# Patient Record
Sex: MALE | URBAN - METROPOLITAN AREA
[De-identification: names, ages, dates, MRNs, and addresses within clinical notes are randomized per-mention and may not be internally consistent; named-entity substitution may affect disease eponyms.]

---

## 2021-03-08 ENCOUNTER — NURSE ONLY (OUTPATIENT)
Dept: PRIMARY CARE CLINIC | Age: 71
End: 2021-03-08
Payer: MEDICARE

## 2021-03-08 DIAGNOSIS — Z23 HIGH PRIORITY FOR COVID-19 VIRUS VACCINATION: Primary | ICD-10-CM

## 2021-03-11 ENCOUNTER — NURSE ONLY (OUTPATIENT)
Dept: PRIMARY CARE CLINIC | Age: 71
End: 2021-03-11

## 2021-03-11 PROCEDURE — 91301 COVID-19, MODERNA VACCINE 100MCG/0.5ML DOSE: CPT | Performed by: FAMILY MEDICINE

## 2021-03-11 PROCEDURE — 0011A COVID-19, MODERNA VACCINE 100MCG/0.5ML DOSE: CPT | Performed by: FAMILY MEDICINE

## 2021-04-09 ENCOUNTER — NURSE ONLY (OUTPATIENT)
Dept: PRIMARY CARE CLINIC | Age: 71
End: 2021-04-09

## 2021-04-09 DIAGNOSIS — Z23 HIGH PRIORITY FOR COVID-19 VIRUS VACCINATION: Primary | ICD-10-CM

## 2022-12-08 ENCOUNTER — OFFICE VISIT (OUTPATIENT)
Dept: ORTHOPEDIC SURGERY | Age: 72
End: 2022-12-08
Payer: MEDICARE

## 2022-12-08 VITALS — WEIGHT: 223 LBS | HEIGHT: 68 IN | BODY MASS INDEX: 33.8 KG/M2

## 2022-12-08 DIAGNOSIS — M25.819 CYST OF JOINT OF SHOULDER: ICD-10-CM

## 2022-12-08 DIAGNOSIS — M25.511 RIGHT SHOULDER PAIN, UNSPECIFIED CHRONICITY: Primary | ICD-10-CM

## 2022-12-08 PROCEDURE — 3017F COLORECTAL CA SCREEN DOC REV: CPT | Performed by: ORTHOPAEDIC SURGERY

## 2022-12-08 PROCEDURE — 99203 OFFICE O/P NEW LOW 30 MIN: CPT | Performed by: ORTHOPAEDIC SURGERY

## 2022-12-08 PROCEDURE — G8484 FLU IMMUNIZE NO ADMIN: HCPCS | Performed by: ORTHOPAEDIC SURGERY

## 2022-12-08 PROCEDURE — 1123F ACP DISCUSS/DSCN MKR DOCD: CPT | Performed by: ORTHOPAEDIC SURGERY

## 2022-12-08 PROCEDURE — G8427 DOCREV CUR MEDS BY ELIG CLIN: HCPCS | Performed by: ORTHOPAEDIC SURGERY

## 2022-12-08 PROCEDURE — 4004F PT TOBACCO SCREEN RCVD TLK: CPT | Performed by: ORTHOPAEDIC SURGERY

## 2022-12-08 PROCEDURE — G8417 CALC BMI ABV UP PARAM F/U: HCPCS | Performed by: ORTHOPAEDIC SURGERY

## 2022-12-08 RX ORDER — EZETIMIBE 10 MG/1
TABLET ORAL NIGHTLY
COMMUNITY
Start: 2022-05-31

## 2022-12-08 RX ORDER — BUSPIRONE HYDROCHLORIDE 10 MG/1
TABLET ORAL 2 TIMES DAILY
COMMUNITY
Start: 2022-05-31

## 2022-12-08 RX ORDER — CELECOXIB 200 MG/1
CAPSULE ORAL DAILY
COMMUNITY
Start: 2022-05-31

## 2022-12-08 RX ORDER — TAMSULOSIN HYDROCHLORIDE 0.4 MG/1
CAPSULE ORAL NIGHTLY
COMMUNITY
Start: 2022-05-31

## 2022-12-08 RX ORDER — OMEPRAZOLE 20 MG/1
CAPSULE, DELAYED RELEASE ORAL
COMMUNITY
Start: 2022-05-31

## 2022-12-08 RX ORDER — CITALOPRAM 20 MG/1
TABLET ORAL DAILY
COMMUNITY
Start: 2022-05-31

## 2022-12-08 NOTE — PROGRESS NOTES
[facility]  History and Physical  Shoulder Pain    Date:  2022    Name:  Francie Canales  Address:  [address] [place] [address]  Annabel Castillo [id number]    :  1950      Age:   81 y.o.    SSN:  (Not on file)      Medical Record Number:  [id number]    Reason for Visit:    New Patient (OP/NP Right Shoulder)      HPI:   Francie Canales is a 81 y.o. male who presents to our office today complaining of  right shoulder pain. Patient reports he started noticing a swelling over the right shoulder that appeared about 6 months ago and has only grown in size since then. He reports its not very achy and does not have a sharp pain. He reports on occasion he has some mild soreness. It does not restrict his movement or strength in any way. Of note the patient does have a history of undergoing a right rotator cuff repair nearly 15 years ago. He continues to do well from that surgery and has not had any questions or concerns since then. 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 Pain Assessment  Location of Pain: Shoulder  Location Modifiers: Right  Severity of Pain: 3  Quality of Pain: Sharp  Duration of Pain: Persistent  Frequency of Pain: Intermittent  Aggravating Factors:  (positional)  Limiting Behavior: No  Relieving Factors: Rest  Work-Related Injury: No  Are there other pain locations you wish to document?: No    Review of Systems:  A 14 point review of systems available in the scanned medical record as documented by the patient. The review is negative with the exception of those things mentioned in the History of Present Illness and Past Medical History. Past History:  Past Medical History:   Diagnosis Date    Arthritis     Cancer ([facility] [address]     H/O ulcer disease     Kidney problem      No past surgical history on file.   Current Outpatient Medications on File Prior to Visit   Medication Sig Dispense Refill    celecoxib (CELEBREX) 200 MG capsule Take by mouth daily      busPIRone (BUSPAR) 10 MG tablet Take by mouth 2 times daily      citalopram (CELEXA) 20 MG tablet Take by mouth daily      tamsulosin (FLOMAX) 0.4 MG capsule Take by mouth nightly      omeprazole (PRILOSEC) 20 MG delayed release capsule Take by mouth 2 times daily (before meals)      ezetimibe (ZETIA) 10 MG tablet Take by mouth nightly       No current facility-administered medications on file prior to visit.      Social History     Socioeconomic History    Marital status: Unknown     Spouse name: Not on file    Number of children: Not on file    Years of education: Not on file    Highest education level: Not on file   Occupational History    Not on file   Tobacco Use    Smoking status: Former     Types: Cigarettes     Quit date: 12     Years since quittin.9    Smokeless tobacco: Never   Substance and Sexual Activity    Alcohol use: Not on file    Drug use: Not on file    Sexual activity: Not on file   Other Topics Concern    Not on file   Social History Narrative    Not on file     Social Determinants of Health     Financial Resource Strain: Not on file   Food Insecurity: Not on file   Transportation Needs: Not on file   Physical Activity: Not on file   Stress: Not on file   Social Connections: Not on file   Intimate Partner Violence: Not on file   Housing Stability: Not on file     Family History   Problem Relation Age of Onset    Cancer Mother     Cancer Father        Current Medications:    Current Outpatient Medications   Medication Sig Dispense Refill    celecoxib (CELEBREX) 200 MG capsule Take by mouth daily      busPIRone (BUSPAR) 10 MG tablet Take by mouth 2 times daily      citalopram (CELEXA) 20 MG tablet Take by mouth daily      tamsulosin (FLOMAX) 0.4 MG capsule Take by mouth nightly      omeprazole (PRILOSEC) 20 MG delayed release capsule Take by mouth 2 times daily (before meals)      ezetimibe (ZETIA) 10 MG tablet Take by mouth nightly       No current facility-administered medications for this visit. Allergies: Allergies   Allergen Reactions    Hydrocodone-Acetaminophen Other (See Comments) and Swelling     faints  faints      Penicillins Anaphylaxis     Patient states I can take Cephalosporins. Patient states I can take Cephalosporins. Physical Exam:  Vitals:     General: Ramon Allen is a healthy and well appearing 81 y.o. male who is sitting comfortably in our office in acute distress. General Exam:   Constitutional: Patient is adequately groomed with no evidence of malnutrition  DTRs: Deep tendon reflexes are intact  Mental Status: The patient is oriented to time, place and person. The patient's mood and affect are appropriate. Lymphatic: The lymphatic examination bilaterally reveals all areas to be without enlargement or induration. Vascular: Examination reveals no swelling or calf tenderness. Peripheral pulses are palpable and 2+. Neurological: The patient has good coordination. There is no weakness or sensory deficit. Neuro: alert. Oriented X 3  Eyes: Extra-ocular muscles intact  Mouth: Oral mucosa moist. No perioral lesions  Pulm: Respirations unlabored and regular. right Shoulder Exam:  Inspection: There is a prominent nodule over the right anterior shoulder, no gross deformities, no signs of infection. Palpation:  There is no crepitus. Non-tender to palpation over the inch and a half nodule. Tenderness over the rotator cuff footprint, no tenderness in the bicipital groove. Active Range of Motion: Forward elevation of 160, abduction of 160, external rotation with elbow at the side 45, internal rotation to the back is T9    Passive Range of Motion: Passively forward elevation can be further increased to 160.     Strength:   External rotation with resistance with elbow at the side 5/5, internal rotation with resistance with elbow at the side 5/5, Champagne toast testing 5/5, Jobes test 5/5    Special Tests: No Jin muscle deformity. Neurovascular: Sensation to light touch is intact, no motor deficits, palpable radial pulses 2+    Additional Examinations:    Examination of the contralateral extremity does not show any tenderness, deformity or injury. Range of motion is unremarkable. There is no gross instability. There are no rashes, ulcerations or lesions. Strength and tone are normal.    Laboratory:  No visits with results within 14 Day(s) from this visit. Latest known visit with results is:   No results found for any previous visit. No results found for this or any previous visit (from the past 24 hour(s)). Radiographic:  3 xray views of the right  shoulder including True AP in internal and external and axillary lateral were taken in our office today reveal no fractures, dislocations, visible tumors, or signs of acute trauma. Self assessment questionnaires including ASES and Simple Shoulder Test were completed today. Assessment:  Kaur Matias is a 81 y.o. male with a right shoulder AC joint cyst that has been enlarging over the last 6 months  Impression:  Encounter Diagnoses   Name Primary? Right shoulder pain, unspecified chronicity Yes    Cyst of joint of shoulder        Office Procedures:  Orders Placed This Encounter   Procedures    XR SHOULDER RIGHT (MIN 2 VIEWS)     Standing Status:   Future     Number of Occurrences:   1     Standing Expiration Date:   12/6/2023     Order Specific Question:   Reason for exam:     Answer:   right shoulder pain    MRI SHOULDER RIGHT WO CONTRAST     Standing Status:   Future     Standing Expiration Date:   12/8/2023     Order Specific Question:   Reason for exam:     Answer:   Right shoulder eval soft tissue mass       Plan: At this time we recommend obtaining an MRI of the shoulder to evaluate the cyst.  We would like to rule out any malignancy.   We did discuss with him an excision of this cyst or nodule but we will plan on that once the results are back from the MRI. Sherryl Goodell will follow up in 4 weeks and/or as needed. He was in agreement with this plan and all questions were answered to the patient's satisfaction. He was encouraged to call with any questions. 12/8/2022  3:25 PM      Mary Ceron PA-C  Orthopaedic Sports Medicine Physician Assistant    During this examination, I, Mary Ceron PA-C, functioned as a scribe for Dr. Geovany Villalobos. This dictation was performed with a verbal recognition program (DRAGON) and it was checked for errors. It is possible that there are still dictated errors within this office note. If so, please bring any errors to my attention for an addendum. All efforts were made to ensure that this office note is accurate.  ____________________    I, Dr. Geovany Villalobos, personally performed the services described in this documentation as described by Mary Ceron PA-C in my presence, and it is both accurate and complete. Kecia Banda MD, PhD  12/8/2022

## 2023-01-05 ENCOUNTER — OFFICE VISIT (OUTPATIENT)
Dept: ORTHOPEDIC SURGERY | Age: 73
End: 2023-01-05
Payer: MEDICARE

## 2023-01-05 VITALS — BODY MASS INDEX: 33.8 KG/M2 | HEIGHT: 68 IN | WEIGHT: 223 LBS

## 2023-01-05 DIAGNOSIS — M67.411 GANGLION, RIGHT SHOULDER: Primary | ICD-10-CM

## 2023-01-05 PROCEDURE — 99213 OFFICE O/P EST LOW 20 MIN: CPT | Performed by: ORTHOPAEDIC SURGERY

## 2023-01-05 PROCEDURE — 1036F TOBACCO NON-USER: CPT | Performed by: ORTHOPAEDIC SURGERY

## 2023-01-05 PROCEDURE — 1123F ACP DISCUSS/DSCN MKR DOCD: CPT | Performed by: ORTHOPAEDIC SURGERY

## 2023-01-05 PROCEDURE — G8417 CALC BMI ABV UP PARAM F/U: HCPCS | Performed by: ORTHOPAEDIC SURGERY

## 2023-01-05 PROCEDURE — G8427 DOCREV CUR MEDS BY ELIG CLIN: HCPCS | Performed by: ORTHOPAEDIC SURGERY

## 2023-01-05 PROCEDURE — 3017F COLORECTAL CA SCREEN DOC REV: CPT | Performed by: ORTHOPAEDIC SURGERY

## 2023-01-05 PROCEDURE — G8484 FLU IMMUNIZE NO ADMIN: HCPCS | Performed by: ORTHOPAEDIC SURGERY

## 2023-01-05 NOTE — PROGRESS NOTES
Chief Complaint    Shoulder Pain (F/U RIGHT SHOULDER)      History of Present Illness:  Errol Au is a pleasant, 81 y.o., male, here today for follow up of his right shoulder. He has a lump over the top of his shoulder that has been enlarging. It is not painful. At his last visit we recommended an MRI because the mass was firm and he is here today for MRI results. He reports no new injuries or setbacks. Pain Assessment  Location of Pain: Shoulder  Location Modifiers: Right  Severity of Pain: 4  Duration of Pain: Persistent  Frequency of Pain: Constant  Aggravating Factors: Other (Comment)  Relieving Factors: Rest  Work-Related Injury: No  Are there other pain locations you wish to document?: No      Medical History:  Patient's medications, allergies, past medical, surgical, social and family histories were reviewed and updated as appropriate. No notes on file    Review of Systems  A 14 point review of systems was completed by the patient and is available in the media section of the scanned medical record and was reviewed on 1/5/2023. The review is negative with the exception of those things mentioned in the HPI and Past Medical History    Vital Signs: There were no vitals filed for this visit. General/Appearance: Alert and oriented and in no apparent distress. Skin:  There are no skin lesions, cellulitis, or extreme edema. The patient has warm and well-perfused Bilateral upper extremities with brisk capillary refill.    right Shoulder Exam:  Inspection: There is a prominent nodule over the right anterior shoulder, no gross deformities, no signs of infection. Palpation:  There is no crepitus. Non-tender to palpation over the inch and a half nodule. Tenderness over the rotator cuff footprint, no tenderness in the bicipital groove. Active Range of Motion:  Forward elevation of 160, abduction of 160, external rotation with elbow at the side 45, internal rotation to the back is T9 Passive Range of Motion: Passively forward elevation can be further increased to 160. Strength:   External rotation with resistance with elbow at the side 5/5, internal rotation with resistance with elbow at the side 5/5, Champagne toast testing 5/5, Jobes test 5/5     Special Tests:  No Jin muscle deformity. Neurovascular: Sensation to light touch is intact, no motor deficits, palpable radial pulses 2+     Additional Examinations:    Examination of the contralateral extremity does not show any tenderness, deformity or injury. Range of motion is unremarkable. There is no gross instability. There are no rashes, ulcerations or lesions. Strength and tone are normal.      Radiology:     MRI dated 12/10/2022  CONCLUSION:   1. Extensive postsurgical changes of the rotator cuff with metal susceptibility artifact in    place. Degeneration and mucoid change supraspinatus with thinning and partial-thickness tearing    suspected. No retracted retear evident. 2. Subscapularis tendinosis with distal interstitial delamination measuring 1 cm. 3. Large capsular or ganglion cyst along the anterolateral aspect of the shoulder measures 2.8    cm and extends into the anterior head of the deltoid muscle. 4. Fraying and degeneration of the labrum with attritional tearing. No paralabral cyst    formation evident. 5. Mild glenohumeral joint arthropathy with spurring of the inferomedial humeral head and joint    space loss. Assessment :  Mr. Cecilia James is a pleasant, 81 y.o. patient who is presenting today with a mildly painful ganglion cyst in the anterior shoulder. It is benign appearing and so he has the option of observation vs. Surgical excision      Impression:  Encounter Diagnosis   Name Primary? Ganglion, right shoulder Yes       Office Procedures:  No orders of the defined types were placed in this encounter. Treatment Plan:  At this point we did offer Jurgen the option for surgical intervention to resect the ganglion cyst in question. He expressed his interest in proceeding with surgical excision. Risks, benefits and potential complications of arthroscopic shoulder surgery were discussed with the patient. Risks discussed include but are not limited to bleeding, infection, anesthetic risk, injury to nerves and blood vessels, deep vein thrombosis, residual stiffness and weakness, and the need for revision surgery. The patient also understands that anesthetic risks include cardiopulmonary issues, drug reactions and even death. The patient voices an understanding of the importance of physical therapy and home exercises after surgery. All questions were answered and written informed consent for surgery was obtained today. We will see Joshua Isaacs back for postop visit. All questions were answered to patient's satisfaction and He was encouraged to call with any further questions or concerns. Makayla Granados is in agreement with this plan. 1/5/2023  3:32 PM    Suellen Armstrong MD [facility]    Orthopaedic Surgeon, Clinical Fellow  Tracie Moya     The encounter with the patient was supervised by Dr. Antonia Myles who personally examined the patient and reviewed the plan. This dictation was performed with a verbal recognition program (DRAGON) and it was checked for errors. It is possible that there are still dictated errors within this office note. If so, please bring any errors to my attention for an addendum. All efforts were made to ensure that this office note is accurate.  ________________  I was physically present and personally supervised the Orthopaedic Sports Medicine Fellow in the evaluation and development of a treatment plan for this patient. I personally interviewed the patient and performed a physical examination. In addition, I discussed the patient's condition and treatment options with them.  I have also reviewed and agree with the past medical, family and social history unless otherwise noted. All of the patient's questions were answered. Kecia Quinteros MD, PhD  1/5/2023

## 2023-01-17 ENCOUNTER — TELEPHONE (OUTPATIENT)
Dept: ORTHOPEDIC SURGERY | Age: 73
End: 2023-01-17

## 2023-01-17 NOTE — TELEPHONE ENCOUNTER
Auth: NPR  Date: 01/17/23 thru 04/17/23  Reference # [id number]  Spoke with: Online  Type of SX: Outpatient  Location: [facility]  CPT: 72428   DX: M25.511, M25.819  SX area: Rt shoulder  Insurance: [employer]

## 2023-01-31 NOTE — PROGRESS NOTES
Place patient label inside box (if no patient label, complete below)  Name:  :  MR#:   Isa Pelt / PROCEDURE  I (we), Ck Gaby (Patient Name) authorize Omer Abad MD (Provider / Tom Gómez) and/or such assistants as may be selected by him/her, to perform the following operation/procedure(s): RIGHT SHOULDER OPEN GANGLION CYST EXCISION        Note: If unable to obtain consent prior to an emergent procedure, document the emergent reason in the medical record. This procedure has been explained to my (our) satisfaction and included in the explanation was: The intended benefit, nature, and extent of the procedure to be performed; The significant risks involved and the probability of success; Alternative procedures and methods of treatment; The dangers and probable consequences of such alternatives (including no procedure or treatment); The expected consequences of the procedure on my future health; Whether other qualified individuals would be performing important surgical tasks and/or whether  would be present to advise or support the procedure. I (we) understand that there are other risks of infection and other serious complications in the pre-operative/procedural and postoperative/procedural stages of my (our) care. I (we) have asked all of the questions which I (we) thought were important in deciding whether or not to undergo treatment or diagnosis. These questions have been answered to my (our) satisfaction. I (we) understand that no assurance can be given that the procedure will be a success, and no guarantee or warranty of success has been given to me (us). It has been explained to me (us) that during the course of the operation/procedure, unforeseen conditions may be revealed that necessitate extension of the original procedure(s) or different procedure(s) than those set forth in Paragraph 1.  I (we) authorize and request that the above-named physician, his/her assistants or his/her designees, perform procedures as necessary and desirable if deemed to be in my (our) best interest.     Revised 8/2/2021                                                                          Page 1 of 2         I acknowledge that health care personnel may be observing this procedure for the purpose of medical education or other specified purposes as may be necessary as requested and/or approved by my (our) physician. I (we) consent to the disposal by the hospital Pathologist of the removed tissue, parts or organs in accordance with hospital policy. I do ____ do not ____ consent to the use of a local infiltration pain blocking agent that will be used by my provider/surgical provider to help alleviate pain during my procedure. I do ____ do not ____ consent to an emergent blood transfusion in the case of a life-threatening situation that requires blood components to be administered. This consent is valid for 24 hours from the beginning of the procedure. This patient does ____ or does not ____ currently have a DNR status/order. If DNR order is in place, obtain Addendum to the Surgical Consent for ALL Patients with a DNR Order to address crystal-operative status for limited intervention or DNR suspension.      I have read and fully understand the above Consent for Operation/Procedure and that all blanks were completed before I signed the consent.   _____________________________       _____________________      ____/____am/pm  Signature of Patient or legal representative      Printed Name / Relationship            Date / Time   ____________________________       _____________________      ____/____am/pm  Witness to Signature                                    Printed Name                    Date / Time    If patient is unable to sign or is a minor, complete the following)  Patient is a minor, ____ years of age, or unable to sign because: ______________________________________________________________________________________________    If a phone consent is obtained, consent will be documented by using two health care professionals, each affirming that the consenting party has no questions and gives consent for the procedure discussed with the physician/provider.   _____________________          ____________________       _____/_____am/pm   2nd witness to phone consent        Printed name           Date / Time    Informed Consent:  I have provided the explanation described above in section 1 to the patient and/or legal representative.  I have provided the patient and/or legal representative with an opportunity to ask any questions about the proposed operation/procedure.   ___________________________          ____________________         ____/____am/pm  Provider / Proceduralist                            Printed name            Date / Time  Revised 8/2/2021                                                                      Page 2 of 2

## 2023-01-31 NOTE — PROGRESS NOTES

## 2023-02-03 ENCOUNTER — ANESTHESIA EVENT (OUTPATIENT)
Dept: OPERATING ROOM | Age: 73
End: 2023-02-03
Payer: MEDICARE

## 2023-02-06 ENCOUNTER — HOSPITAL ENCOUNTER (OUTPATIENT)
Age: 73
Setting detail: OUTPATIENT SURGERY
Discharge: HOME | End: 2023-02-06
Attending: ORTHOPAEDIC SURGERY | Admitting: ORTHOPAEDIC SURGERY
Payer: MEDICARE

## 2023-02-06 ENCOUNTER — ANESTHESIA (OUTPATIENT)
Dept: OPERATING ROOM | Age: 73
End: 2023-02-06
Payer: MEDICARE

## 2023-02-06 VITALS
TEMPERATURE: 97.7 F | HEART RATE: 97 BPM | RESPIRATION RATE: 14 BRPM | DIASTOLIC BLOOD PRESSURE: 72 MMHG | BODY MASS INDEX: 32.67 KG/M2 | OXYGEN SATURATION: 95 % | SYSTOLIC BLOOD PRESSURE: 124 MMHG | HEIGHT: 68 IN | WEIGHT: 215.6 LBS

## 2023-02-06 DIAGNOSIS — M25.819 CYST OF JOINT OF SHOULDER: ICD-10-CM

## 2023-02-06 DIAGNOSIS — Z98.890 S/P SHOULDER SURGERY: Primary | ICD-10-CM

## 2023-02-06 PROCEDURE — 7100000011 HC PHASE II RECOVERY - ADDTL 15 MIN: Performed by: ORTHOPAEDIC SURGERY

## 2023-02-06 PROCEDURE — 88304 TISSUE EXAM BY PATHOLOGIST: CPT

## 2023-02-06 PROCEDURE — 6370000000 HC RX 637 (ALT 250 FOR IP): Performed by: ANESTHESIOLOGY

## 2023-02-06 PROCEDURE — 2580000003 HC RX 258: Performed by: ANESTHESIOLOGY

## 2023-02-06 PROCEDURE — C1763 CONN TISS, NON-HUMAN: HCPCS | Performed by: ORTHOPAEDIC SURGERY

## 2023-02-06 PROCEDURE — A4217 STERILE WATER/SALINE, 500 ML: HCPCS | Performed by: ORTHOPAEDIC SURGERY

## 2023-02-06 PROCEDURE — 3600000014 HC SURGERY LEVEL 4 ADDTL 15MIN: Performed by: ORTHOPAEDIC SURGERY

## 2023-02-06 PROCEDURE — 3600000004 HC SURGERY LEVEL 4 BASE: Performed by: ORTHOPAEDIC SURGERY

## 2023-02-06 PROCEDURE — 2580000003 HC RX 258: Performed by: ORTHOPAEDIC SURGERY

## 2023-02-06 PROCEDURE — 6370000000 HC RX 637 (ALT 250 FOR IP): Performed by: ORTHOPAEDIC SURGERY

## 2023-02-06 PROCEDURE — 2500000003 HC RX 250 WO HCPCS: Performed by: ORTHOPAEDIC SURGERY

## 2023-02-06 PROCEDURE — 7100000001 HC PACU RECOVERY - ADDTL 15 MIN: Performed by: ORTHOPAEDIC SURGERY

## 2023-02-06 PROCEDURE — 6360000002 HC RX W HCPCS: Performed by: ANESTHESIOLOGY

## 2023-02-06 PROCEDURE — 2709999900 HC NON-CHARGEABLE SUPPLY: Performed by: ORTHOPAEDIC SURGERY

## 2023-02-06 PROCEDURE — 3700000001 HC ADD 15 MINUTES (ANESTHESIA): Performed by: ORTHOPAEDIC SURGERY

## 2023-02-06 PROCEDURE — 2500000003 HC RX 250 WO HCPCS: Performed by: NURSE ANESTHETIST, CERTIFIED REGISTERED

## 2023-02-06 PROCEDURE — 3700000000 HC ANESTHESIA ATTENDED CARE: Performed by: ORTHOPAEDIC SURGERY

## 2023-02-06 PROCEDURE — 7100000000 HC PACU RECOVERY - FIRST 15 MIN: Performed by: ORTHOPAEDIC SURGERY

## 2023-02-06 PROCEDURE — 6360000002 HC RX W HCPCS: Performed by: NURSE ANESTHETIST, CERTIFIED REGISTERED

## 2023-02-06 PROCEDURE — 7100000010 HC PHASE II RECOVERY - FIRST 15 MIN: Performed by: ORTHOPAEDIC SURGERY

## 2023-02-06 PROCEDURE — C1713 ANCHOR/SCREW BN/BN,TIS/BN: HCPCS | Performed by: ORTHOPAEDIC SURGERY

## 2023-02-06 DEVICE — ANCHOR SUT L14.7MM DIA5.5MM BIOCOMPOSITE W/ 3 SZ 2: Type: IMPLANTABLE DEVICE | Site: SHOULDER | Status: FUNCTIONAL

## 2023-02-06 DEVICE — IMPLANTABLE DEVICE
Type: IMPLANTABLE DEVICE | Site: SHOULDER | Status: FUNCTIONAL
Brand: BIOINDUCTIVE IMPLANT WITH ARTHROSCOPIC DELIVERY SYSTEM - LARGE

## 2023-02-06 DEVICE — BONE ANCHORS 3 WITH ARTHROSCOPIC DELIVERY SYSTEM ADVANCED
Type: IMPLANTABLE DEVICE | Site: SHOULDER | Status: FUNCTIONAL
Brand: BONE ANCHORS WITH ARTHROSCOPIC DELIVERY SYSTEM - ADVANCED

## 2023-02-06 DEVICE — ANCHOR TEND 8 FOR REGENETEN BIOINDUCTIVE IMPL SYS: Type: IMPLANTABLE DEVICE | Site: SHOULDER | Status: FUNCTIONAL

## 2023-02-06 RX ORDER — MAGNESIUM HYDROXIDE 1200 MG/15ML
LIQUID ORAL CONTINUOUS PRN
Status: DISCONTINUED | OUTPATIENT
Start: 2023-02-06 | End: 2023-02-06 | Stop reason: HOSPADM

## 2023-02-06 RX ORDER — SODIUM CHLORIDE 0.9 % (FLUSH) 0.9 %
5-40 SYRINGE (ML) INJECTION PRN
Status: DISCONTINUED | OUTPATIENT
Start: 2023-02-06 | End: 2023-02-06 | Stop reason: HOSPADM

## 2023-02-06 RX ORDER — PROCHLORPERAZINE EDISYLATE 5 MG/ML
5 INJECTION INTRAMUSCULAR; INTRAVENOUS
Status: DISCONTINUED | OUTPATIENT
Start: 2023-02-06 | End: 2023-02-06 | Stop reason: HOSPADM

## 2023-02-06 RX ORDER — SODIUM CHLORIDE 0.9 % (FLUSH) 0.9 %
5-40 SYRINGE (ML) INJECTION EVERY 12 HOURS SCHEDULED
Status: DISCONTINUED | OUTPATIENT
Start: 2023-02-06 | End: 2023-02-06 | Stop reason: HOSPADM

## 2023-02-06 RX ORDER — LABETALOL HYDROCHLORIDE 5 MG/ML
10 INJECTION, SOLUTION INTRAVENOUS
Status: DISCONTINUED | OUTPATIENT
Start: 2023-02-06 | End: 2023-02-06 | Stop reason: HOSPADM

## 2023-02-06 RX ORDER — ONDANSETRON 2 MG/ML
4 INJECTION INTRAMUSCULAR; INTRAVENOUS
Status: DISCONTINUED | OUTPATIENT
Start: 2023-02-06 | End: 2023-02-06 | Stop reason: HOSPADM

## 2023-02-06 RX ORDER — OXYCODONE HYDROCHLORIDE AND ACETAMINOPHEN 5; 325 MG/1; MG/1
1 TABLET ORAL ONCE
Status: COMPLETED | OUTPATIENT
Start: 2023-02-06 | End: 2023-02-06

## 2023-02-06 RX ORDER — KETAMINE HCL IN NACL, ISO-OSM 20 MG/2 ML
SYRINGE (ML) INJECTION PRN
Status: DISCONTINUED | OUTPATIENT
Start: 2023-02-06 | End: 2023-02-06 | Stop reason: SDUPTHER

## 2023-02-06 RX ORDER — EPHEDRINE SULFATE 50 MG/ML
INJECTION INTRAVENOUS PRN
Status: DISCONTINUED | OUTPATIENT
Start: 2023-02-06 | End: 2023-02-06 | Stop reason: SDUPTHER

## 2023-02-06 RX ORDER — SODIUM CHLORIDE, SODIUM LACTATE, POTASSIUM CHLORIDE, CALCIUM CHLORIDE 600; 310; 30; 20 MG/100ML; MG/100ML; MG/100ML; MG/100ML
INJECTION, SOLUTION INTRAVENOUS CONTINUOUS
Status: DISCONTINUED | OUTPATIENT
Start: 2023-02-06 | End: 2023-02-06 | Stop reason: HOSPADM

## 2023-02-06 RX ORDER — OXYCODONE HYDROCHLORIDE 5 MG/1
5 TABLET ORAL PRN
Status: DISCONTINUED | OUTPATIENT
Start: 2023-02-06 | End: 2023-02-06 | Stop reason: HOSPADM

## 2023-02-06 RX ORDER — DEXAMETHASONE SODIUM PHOSPHATE 4 MG/ML
INJECTION, SOLUTION INTRA-ARTICULAR; INTRALESIONAL; INTRAMUSCULAR; INTRAVENOUS; SOFT TISSUE PRN
Status: DISCONTINUED | OUTPATIENT
Start: 2023-02-06 | End: 2023-02-06 | Stop reason: SDUPTHER

## 2023-02-06 RX ORDER — OXYCODONE HYDROCHLORIDE AND ACETAMINOPHEN 5; 325 MG/1; MG/1
1 TABLET ORAL
Status: COMPLETED | OUTPATIENT
Start: 2023-02-06 | End: 2023-02-06

## 2023-02-06 RX ORDER — GLYCOPYRROLATE 1 MG/5 ML
SYRINGE (ML) INTRAVENOUS PRN
Status: DISCONTINUED | OUTPATIENT
Start: 2023-02-06 | End: 2023-02-06 | Stop reason: SDUPTHER

## 2023-02-06 RX ORDER — LIDOCAINE HYDROCHLORIDE 20 MG/ML
INJECTION, SOLUTION INTRAVENOUS PRN
Status: DISCONTINUED | OUTPATIENT
Start: 2023-02-06 | End: 2023-02-06 | Stop reason: SDUPTHER

## 2023-02-06 RX ORDER — BUPIVACAINE HYDROCHLORIDE AND EPINEPHRINE 5; 5 MG/ML; UG/ML
INJECTION, SOLUTION EPIDURAL; INTRACAUDAL; PERINEURAL PRN
Status: DISCONTINUED | OUTPATIENT
Start: 2023-02-06 | End: 2023-02-06 | Stop reason: ALTCHOICE

## 2023-02-06 RX ORDER — HYDRALAZINE HYDROCHLORIDE 20 MG/ML
10 INJECTION INTRAMUSCULAR; INTRAVENOUS
Status: DISCONTINUED | OUTPATIENT
Start: 2023-02-06 | End: 2023-02-06 | Stop reason: HOSPADM

## 2023-02-06 RX ORDER — SENNA PLUS 8.6 MG/1
1 TABLET ORAL 2 TIMES DAILY PRN
Qty: 20 TABLET | Refills: 0 | Status: SHIPPED | OUTPATIENT
Start: 2023-02-06 | End: 2023-02-20

## 2023-02-06 RX ORDER — FENTANYL CITRATE 50 UG/ML
25 INJECTION, SOLUTION INTRAMUSCULAR; INTRAVENOUS EVERY 5 MIN PRN
Status: DISCONTINUED | OUTPATIENT
Start: 2023-02-06 | End: 2023-02-06 | Stop reason: HOSPADM

## 2023-02-06 RX ORDER — PROPOFOL 10 MG/ML
INJECTION, EMULSION INTRAVENOUS PRN
Status: DISCONTINUED | OUTPATIENT
Start: 2023-02-06 | End: 2023-02-06 | Stop reason: SDUPTHER

## 2023-02-06 RX ORDER — ASPIRIN 81 MG/1
81 TABLET ORAL 2 TIMES DAILY
Qty: 30 TABLET | Refills: 0 | Status: SHIPPED | OUTPATIENT
Start: 2023-02-06 | End: 2023-02-06 | Stop reason: HOSPADM

## 2023-02-06 RX ORDER — OXYCODONE HYDROCHLORIDE 5 MG/1
10 TABLET ORAL PRN
Status: DISCONTINUED | OUTPATIENT
Start: 2023-02-06 | End: 2023-02-06 | Stop reason: HOSPADM

## 2023-02-06 RX ORDER — FENTANYL CITRATE 50 UG/ML
INJECTION, SOLUTION INTRAMUSCULAR; INTRAVENOUS PRN
Status: DISCONTINUED | OUTPATIENT
Start: 2023-02-06 | End: 2023-02-06 | Stop reason: SDUPTHER

## 2023-02-06 RX ORDER — DOXYCYCLINE HYCLATE 100 MG
100 TABLET ORAL 2 TIMES DAILY
Qty: 10 TABLET | Refills: 0 | Status: SHIPPED | OUTPATIENT
Start: 2023-02-06 | End: 2023-02-11

## 2023-02-06 RX ORDER — ONDANSETRON 4 MG/1
4 TABLET, FILM COATED ORAL EVERY 8 HOURS PRN
Qty: 15 TABLET | Refills: 0 | Status: SHIPPED | OUTPATIENT
Start: 2023-02-06

## 2023-02-06 RX ORDER — OXYCODONE HYDROCHLORIDE AND ACETAMINOPHEN 5; 325 MG/1; MG/1
1 TABLET ORAL EVERY 8 HOURS PRN
Qty: 12 TABLET | Refills: 0 | Status: SHIPPED | OUTPATIENT
Start: 2023-02-06 | End: 2023-02-11

## 2023-02-06 RX ORDER — ONDANSETRON 2 MG/ML
INJECTION INTRAMUSCULAR; INTRAVENOUS PRN
Status: DISCONTINUED | OUTPATIENT
Start: 2023-02-06 | End: 2023-02-06 | Stop reason: SDUPTHER

## 2023-02-06 RX ORDER — SODIUM CHLORIDE 9 MG/ML
INJECTION, SOLUTION INTRAVENOUS PRN
Status: DISCONTINUED | OUTPATIENT
Start: 2023-02-06 | End: 2023-02-06 | Stop reason: HOSPADM

## 2023-02-06 RX ADMIN — Medication 0.2 MG: at 07:56

## 2023-02-06 RX ADMIN — Medication 20 MG: at 07:43

## 2023-02-06 RX ADMIN — ONDANSETRON 4 MG: 2 INJECTION INTRAMUSCULAR; INTRAVENOUS at 07:55

## 2023-02-06 RX ADMIN — EPHEDRINE SULFATE 10 MG: 50 INJECTION INTRAVENOUS at 09:08

## 2023-02-06 RX ADMIN — OXYCODONE AND ACETAMINOPHEN 1 TABLET: 5; 325 TABLET ORAL at 10:30

## 2023-02-06 RX ADMIN — EPHEDRINE SULFATE 10 MG: 50 INJECTION INTRAVENOUS at 08:29

## 2023-02-06 RX ADMIN — SODIUM CHLORIDE, POTASSIUM CHLORIDE, SODIUM LACTATE AND CALCIUM CHLORIDE: 600; 310; 30; 20 INJECTION, SOLUTION INTRAVENOUS at 08:56

## 2023-02-06 RX ADMIN — PHENYLEPHRINE HYDROCHLORIDE 200 MCG: 10 INJECTION, SOLUTION INTRAMUSCULAR; INTRAVENOUS; SUBCUTANEOUS at 08:02

## 2023-02-06 RX ADMIN — DEXAMETHASONE SODIUM PHOSPHATE 4 MG: 4 INJECTION, SOLUTION INTRAMUSCULAR; INTRAVENOUS at 07:56

## 2023-02-06 RX ADMIN — FENTANYL CITRATE 100 MCG: 50 INJECTION, SOLUTION INTRAMUSCULAR; INTRAVENOUS at 08:16

## 2023-02-06 RX ADMIN — FENTANYL CITRATE 50 MCG: 50 INJECTION, SOLUTION INTRAMUSCULAR; INTRAVENOUS at 09:22

## 2023-02-06 RX ADMIN — PHENYLEPHRINE HYDROCHLORIDE 100 MCG: 10 INJECTION, SOLUTION INTRAMUSCULAR; INTRAVENOUS; SUBCUTANEOUS at 07:59

## 2023-02-06 RX ADMIN — PHENYLEPHRINE HYDROCHLORIDE 100 MCG: 10 INJECTION, SOLUTION INTRAMUSCULAR; INTRAVENOUS; SUBCUTANEOUS at 07:56

## 2023-02-06 RX ADMIN — FENTANYL CITRATE 50 MCG: 50 INJECTION, SOLUTION INTRAMUSCULAR; INTRAVENOUS at 08:53

## 2023-02-06 RX ADMIN — PROPOFOL 200 MG: 10 INJECTION, EMULSION INTRAVENOUS at 07:44

## 2023-02-06 RX ADMIN — FENTANYL CITRATE 25 MCG: 50 INJECTION, SOLUTION INTRAMUSCULAR; INTRAVENOUS at 10:50

## 2023-02-06 RX ADMIN — OXYCODONE AND ACETAMINOPHEN 1 TABLET: 5; 325 TABLET ORAL at 11:58

## 2023-02-06 RX ADMIN — HYDROMORPHONE HYDROCHLORIDE 0.5 MG: 1 INJECTION, SOLUTION INTRAMUSCULAR; INTRAVENOUS; SUBCUTANEOUS at 09:47

## 2023-02-06 RX ADMIN — PHENYLEPHRINE HYDROCHLORIDE 200 MCG: 10 INJECTION, SOLUTION INTRAMUSCULAR; INTRAVENOUS; SUBCUTANEOUS at 08:42

## 2023-02-06 RX ADMIN — EPHEDRINE SULFATE 10 MG: 50 INJECTION INTRAVENOUS at 08:45

## 2023-02-06 RX ADMIN — PHENYLEPHRINE HYDROCHLORIDE 200 MCG: 10 INJECTION, SOLUTION INTRAMUSCULAR; INTRAVENOUS; SUBCUTANEOUS at 08:29

## 2023-02-06 RX ADMIN — FENTANYL CITRATE 25 MCG: 50 INJECTION, SOLUTION INTRAMUSCULAR; INTRAVENOUS at 10:20

## 2023-02-06 RX ADMIN — EPHEDRINE SULFATE 10 MG: 50 INJECTION INTRAVENOUS at 08:06

## 2023-02-06 RX ADMIN — SODIUM CHLORIDE, POTASSIUM CHLORIDE, SODIUM LACTATE AND CALCIUM CHLORIDE: 600; 310; 30; 20 INJECTION, SOLUTION INTRAVENOUS at 06:30

## 2023-02-06 RX ADMIN — LIDOCAINE HYDROCHLORIDE 50 MG: 20 INJECTION, SOLUTION INTRAVENOUS at 07:44

## 2023-02-06 RX ADMIN — EPHEDRINE SULFATE 10 MG: 50 INJECTION INTRAVENOUS at 08:44

## 2023-02-06 RX ADMIN — HYDROMORPHONE HYDROCHLORIDE 0.5 MG: 1 INJECTION, SOLUTION INTRAMUSCULAR; INTRAVENOUS; SUBCUTANEOUS at 09:59

## 2023-02-06 RX ADMIN — PHENYLEPHRINE HYDROCHLORIDE 200 MCG: 10 INJECTION, SOLUTION INTRAMUSCULAR; INTRAVENOUS; SUBCUTANEOUS at 08:44

## 2023-02-06 ASSESSMENT — PAIN - FUNCTIONAL ASSESSMENT
PAIN_FUNCTIONAL_ASSESSMENT: ACTIVITIES ARE NOT PREVENTED
PAIN_FUNCTIONAL_ASSESSMENT: ACTIVITIES ARE NOT PREVENTED
PAIN_FUNCTIONAL_ASSESSMENT: 0-10

## 2023-02-06 ASSESSMENT — PAIN DESCRIPTION - LOCATION
LOCATION: SHOULDER

## 2023-02-06 ASSESSMENT — PAIN SCALES - GENERAL
PAINLEVEL_OUTOF10: 8
PAINLEVEL_OUTOF10: 8
PAINLEVEL_OUTOF10: 6
PAINLEVEL_OUTOF10: 7
PAINLEVEL_OUTOF10: 6
PAINLEVEL_OUTOF10: 7
PAINLEVEL_OUTOF10: 6
PAINLEVEL_OUTOF10: 5
PAINLEVEL_OUTOF10: 6

## 2023-02-06 ASSESSMENT — PAIN DESCRIPTION - FREQUENCY
FREQUENCY: CONTINUOUS
FREQUENCY: INTERMITTENT
FREQUENCY: CONTINUOUS

## 2023-02-06 ASSESSMENT — PAIN DESCRIPTION - DESCRIPTORS
DESCRIPTORS: ACHING
DESCRIPTORS: DISCOMFORT

## 2023-02-06 ASSESSMENT — PAIN DESCRIPTION - ORIENTATION
ORIENTATION: RIGHT

## 2023-02-06 ASSESSMENT — PAIN DESCRIPTION - PAIN TYPE
TYPE: SURGICAL PAIN

## 2023-02-06 ASSESSMENT — LIFESTYLE VARIABLES: SMOKING_STATUS: 0

## 2023-02-06 ASSESSMENT — PAIN DESCRIPTION - ONSET: ONSET: ON-GOING

## 2023-02-06 NOTE — PROGRESS NOTES
Pt arrived OR, s/p RIGHT SHOULDER OPEN GANGLION CYST EXCISION ,AC JOINT RECONSTRUCTION, ROTATOR CUFF REPAIR AND PATCH AUGUMENTATION - Right, report received form CRNA and OR RN, pt had more extensive work than anticipated, pt did not have a nerve block  General

## 2023-02-06 NOTE — PROGRESS NOTES
This RN took over care of pt from [address]. Pt alert; speech clear; breathing easily on RA; states pain down to 5 to 6/10 in his shoulder after having been given percocet by [place], RN. Has call light within reach. Family at bedside.

## 2023-02-06 NOTE — PROGRESS NOTES
PACU Transfer to [facility]    Vitals:    02/06/23 1100   BP: 136/79   Pulse: (!) 106   Resp: 12   Temp:    SpO2: 94%   Temp 98.1      Intake/Output Summary (Last 24 hours) at 2/6/2023 1113  Last data filed at 2/6/2023 1111  Gross per 24 hour   Intake 1510 ml   Output --   Net 1510 ml       Pain assessment:    Pain Level: 6 (pt ok to go home, have high pain tolerance)    Patient transferred to care of DANIEL RN.    2/6/2023 HR in range

## 2023-02-06 NOTE — PROGRESS NOTES
Ambulatory Surgery/Procedure Discharge Note    Vitals:    02/06/23 1142   BP:    Pulse: 97   Resp:    Temp:    SpO2:    BP :          124/72  Temp:        97.7  SaO2:        95%    In: 1510 [P.O.:360; I.V.:1150]  Out: 500 [Urine:500] -- Pt drank and had snack and voided in urinal    Restroom use offered before discharge. Yes    Pain assessment:  present - adequately treated, right shoulder  Pain Level: 5    Pt stated his pain down to 5/10 after medications in SDS and ready to discharge home. Right shoulder dressing clean, dry and intact and ice pack to area. Right arm in splint/sling. Pt tolerated drink and snack without problem. IV removed, and dressing applied. Discharge instructions were done with pt and wife by Yolis Bolivar, who reported that per doctor, pt was NOT to take the ordered aspirin due to his problems with same in the past.  Pt voided in urinal.    Patient discharged to home/self care.  Patient discharged via wheel chair by RN to waiting family/S.O.       2/6/2023 1:48 PM

## 2023-02-06 NOTE — DISCHARGE INSTRUCTIONS
Dr. Diann Dempsey Discharge Instructions    Take pain medication as directed. If taking narcotic pain medication, do not take tylenol with this as there is tylenol in percocet/norco. Do not operate machinery while taking narcotic pain medications  Non weight bearing to effected extremity  Maintain sling until follow up or therapy. OK to remove sling several times a day to move elbow and wrist, no shoulder motion unless directed by therapy or Dr. Raad Key. Therapy as instructed. You should receive a call regarding therapy  Resume regular diet  Has reaction to aspirin. Discussed with Dr. Raad Key, weighing risks and benefits, no chemical DVT prophylaxis. Wear his coleen hoses. [address] [facility]    There are potential side effects of anesthesia or sedation you may experience for the first 24 hours. These side effects include:    Confusion or Memory loss, Dizziness, or Delayed Reaction Times   [x]A responsible person should be with you for the next 24 hours. Do not operate any vehicles (automobiles, bicycles, motorcycles) or power tools or machinery for 24 hours. Do not sign any legal documents or make any legal decisions for 24 hours. Do not drink alcohol for 24 hours or while taking narcotic pain medication. Nausea    [x]Start with light diet and progress to your normal diet as you feel like eating. However, if you experience nausea or repeated episodes of vomiting which persist beyond 12-24 hours, notify your physician. Once nausea has passed, remember to keep drinking fluids. Difficulty Passing Urine  [x]Drink extra amounts of fluid today. Notify your physician if you have not urinated within 8 hours after your procedure or you feel uncomfortable. Irritated Throat from a Breathing Tube  [x]Drink extra amounts of fluid today. Lozenges may help.     Muscle Aches  [x]You may experience some generalized body aches as your muscles recover from medications used to relax them during surgery. These will gradually subside. MEDICATION INSTRUCTIONS:  []Prescription(S) x     sent with you. Use as directed. When taking pain medications, you may experience the side effect of dizziness or drowsiness. Do not drink alcohol or drive when taking these medications. [x]Prescription(S) x         e-scribed   to Pharmacy Name and location:    [x]Give the list of your medications to your primary care physician on your next visit. Keep your med list updated and carry it with in case of emergencies. [x] Narcotic pain medications can cause the side effect of significant constipation. You may want to add a stool softener to your postoperative medication schedule or speak to your surgeon on how best to manage this side effect. NARCOTIC SAFETY:  Your pain medicine is only for you to take. Safely store your medicines. Store pills up high and out of reach of children and pets. Ensure safety caps are snapped tightly  Keep track of how many pills you have left    Unused medication can be disposed of by taking them to a drop-off box or take-back program that is authorized by the [facility]. Access to a site near you can be found on the [facility] [employer] [employer] [facility] website ([address] [facility]. [facility]). If you have a CPAP machine, it is very important that you use it daily during all periods of sleep and daytime rest during your recovery at home. Surgery and Anesthesia place a significant amount of stress on your body. Using your CPAP will help keep you safe and lessen the negative effects of that stress. FOLLOW-UP RECOVERY CARE:  [x]Call the office at [phone]  for follow-up appointment 7-10 days unless otherwise instructed and problems    Watch for these possible complications, symptoms, or side effects of anesthesia.   Call physician if they or any other problems occur:  Signs of INFECTION   > Fever over 101°     > Redness, swelling, hardness or warmth at the operative site   >Foul smelling or cloudy drainage at the operative site   Unrelieved PAIN  Unrelieved NAUSEA  Blood soaked dressing. (Some oozing may be normal)  Inability to urinate      Numb, pale, blue, cold or tingling extremity      Physician:  dr. Minda Tello    The above instructions were reviewed with patient/significant other. The following additional patient specific information was reviewed with the patient/significant other:  [x]Procedure/physician specific instructions  [x]Medication information sheet(S) including potential side effects  []Gis egress test  []Pain Ball management  []FAQ Catheter associated blood stream infections  []FAQ Surgical Site Infections  []Other-    I have read and understand the instructions given to me: ____________________________________________   (Patient/S.O. Signature)            Date/time 2/6/2023 9:24 AM         PACU:  [phone]   M-F 700 AM - 7 PM      SAME DAY SERVICES:  [phone] M-F 7AM-6PM        If you smoke STOP. We care about your health! May take down dressing in 72 hours and replace with a clean dressing  May shower after 72 hours. Avoid any submersion of operative extremity. Avoid any hot tubs, tub baths, pools, lakes, ocean ect. Avoid contact with dishwater or dirty water. Call immediately if any increasing redness or drainage, any fevers. Colace for constipation  zofran for nausea        Dr. Lois Chavez and Valerie  Call [phone] for appointment      PERIPHERAL NERVE BLOCK INSTRUCTIONS     Please remember while having a nerve block you are at an increased risk for [address] were given a nerve block today from the anesthesiologist. Most nerve blocks last anywhere from 6-36 hours. You should start taking your pain medication before the block wears off or when you first begin feeling discomfort. It takes at least 30-60 minutes for a pain pill to take effect.  Pain medications should be taken with food.  Consider setting an alarm through the night to help manage your pain level so you do not wake up with too much pain. Pain medicines can cause more sedation and decrease your breathing so ONLY take as directed. If you have Sleep Apnea, you need to use your C-Pap machine. What to expect after a nerve block:    Numbness, tingling- affected area feels heavy or asleep   Weakness or inability to move or control your affected area   Inability to feel temperature changes to your affected area  Usually the weakness wears off first, followed by the tingling or heaviness. You may notice more pain at this point and should start taking your pain meds. If you had a shoulder block, you may experience:   Mild shortness of breath (may be relieved by sitting up in a chair or recliner)   Hoarse voice   Blurry vision   Unequal pupils   Drooping of your face (eye or lip) on the same side as the nerve block   Swelling at the injection site on the side of your neck  These side effects should resolve as the block wears off   IF you have severe or prolonged shortness of breath- GO to the nearest Emergency Room  If you had a nerve block of your arm or leg:   Protect the arm or leg from extreme hot or cold temperatures   Protect the arm or leg from obstructing blood flow by frequent position changes- Make sure fingers/ toes stay pink and warm. Call surgeon with any changes   For leg block, get assistance walking until the block wears off, i.e.:  crutches, walker, support person    If you continue to feel the effects of the nerve block for longer than 72 hours- call [facility] at [phone] and ask to speak with the Anesthesiologist on call.

## 2023-02-06 NOTE — PROGRESS NOTES
Discussed block with the pt, he had more extensive surgery, at this point hedoes not want one, will administer meds as needed

## 2023-02-06 NOTE — ANESTHESIA POSTPROCEDURE EVALUATION
Department of Anesthesiology  Postprocedure Note    Patient: Janet Cobb  MRN: [id number]  YOB: 1950  Date of evaluation: 2/6/2023      Procedure Summary     Date: 02/06/23 Room / Location: [facility] [place] [address] [facility] / [facility]    Anesthesia Start: 3358 Anesthesia Stop: 4578    Procedure: RIGHT SHOULDER OPEN GANGLION CYST EXCISION ,AC JOINT RECONSTRUCTION, ROTATOR CUFF REPAIR AND PATCH AUGUMENTATION (Right) Diagnosis:       Cyst of joint of shoulder      (Cyst of joint of shoulder [M25.819] Right)    Surgeons: Jose J Bautista MD Responsible Provider: Asad Martinez DO    Anesthesia Type: general ASA Status: 3          Anesthesia Type: No value filed.     Kacy Phase I: Kacy Score: 10    Kacy Phase II: Kacy Score: 10      Anesthesia Post Evaluation    Patient location during evaluation: PACU  Patient participation: complete - patient participated  Level of consciousness: awake and alert  Airway patency: patent  Nausea & Vomiting: no nausea and no vomiting  Cardiovascular status: blood pressure returned to baseline  Respiratory status: acceptable  Hydration status: euvolemic

## 2023-02-07 NOTE — OP NOTE
[address], [address]                                OPERATIVE REPORT    PATIENT NAME: Neyda Angel                     :        1950  MED REC NO:   [id number]                          ROOM:  ACCOUNT NO:   [de-identified]                           ADMIT DATE: 2023  PROVIDER:     Juan C Joya MD    DATE OF PROCEDURE:  2023    PREOPERATIVE DIAGNOSIS:  Right shoulder ganglion cyst.    POSTOPERATIVE DIAGNOSES:  Right shoulder ganglion cyst with extensive  postsurgical scar and high-grade bursal-sided rotator cuff tear with  retained sutures. PROCEDURES:  Right shoulder examination under anesthesia, arthrotomy  with excision of large shoulder ganglion cyst followed by open lysis of  adhesions, open removal of retained sutures, and open revision rotator  cuff repair with patch augmentation. ANESTHESIA:  General anesthesia, interscalene block. IV FLUIDS:  1000 mL of crystalloid. ESTIMATED BLOOD LOSS:  25 mL. COMPLICATIONS:  None. SURGEON:  Juan C Joya MD    ASSISTANT:  Nancy De Leon MD.  The availability of Dr. Yogesh Presley as a skilled  first assistant was critically important for retraction during ganglion  cyst excision and also for retraction and suture management during  rotator cuff repair. She also did assist with placement of a graft  during fixation on tendon and bone sites. IMPLANTS:  Arthrex BioComposite Corkscrew anchor 5.5 fully-threaded  anchors x2 and one Smith [employer] Nephew large Regeneten collagen patch  graft. FINDINGS:  Examination revealed a prominent soft fibrous-type nodule  consistent with a ganglion cyst.  Exploration revealed a large  pedunculated ganglion cyst measuring roughly 4 x 4 cm and had gelatinous  material.  It had a very thick stalk, and this was right over some  pseudocapsule over the rotator cuff.   Excising it revealed a high-grade  bursal-sided rotator cuff tear with some suture remnants. These could  be removed. No signs of infection. The far articular side of the  rotator cuff was attached so that this really did represent a high  grade partial-thickness tear save for some microscopic tearing allowing  the ganglion cyst to develop. The tear had a large flap that could  be mobilized and reattached with a couple of suture anchors and  reinforced with a collagen patch graft. The deltoid looked healthy. No  other obvious anomalous findings were noted. We really could not see  the glenohumeral joint very clearly since the articular-sided rotator  cuff was mostly preserved. BRIEF HISTORY AND PRESENTING ILLNESS:  As follows: The patient is a  72-year-old gentleman, well known to me, who I having been caring for  his shoulders now which is for almost 20 years. He presented with a  fleshy, slightly painful mass over his right shoulder, we diagnosed it  as ganglion cyst, it was confirmed by MRI. The MRI had a hard time  identifying whether or not there was a full-thickness rotator cuff tear. It did note some thinning of the rotator cuff, postsurgical changes, but  there was some artifact from metal debris and so forth. He wanted to  have the cyst removed and so we planned on open excision, but he  understood that concurrent lesions would be addressed as encountered  depending on what we find at surgery. I did not feel we needed to scope  his shoulder. He understood the potential risks and benefits of  surgery. He understood the risks such as bleeding, infection,  anesthetic risks, injury to nerve and blood vessels, stiffness,  weakness, incomplete pain relief, and need for further surgery. He  understood all of this and gave informed consent. He was scheduled on  an elective basis after appropriate preoperative medical clearance.     OPERATIVE PROCEDURE:  On the date of procedure, the patient was brought  back to the operating room, placed supine on the operating table.   General anesthesia was established. Preoperative antibiotics and  interscalene block were given in the holding area. Under anesthesia,  exam was carried out with the findings as noted above. The patient was  positioned using a Tenet beach-chair positioner in a sitting position. All pressure points were padded. The patient's right shoulder and arm  were prepped and draped in a standard manner for shoulder surgery. We  used Tenet Spider arm purcell for arm position. We called timeout and  then marked our incision right over the palpable swelling. This was  around 4 cm saber-type incision. We called timeout and then made our  incision. We used a 15-blade to incise the skin and then develop  full-thickness subcutaneous flaps. We split the deltoid to avoid injury  to the cyst and then repositioned our retractors. We gently peeled the  stalk from the overlying scar. We could see right underneath and  anterior to the acromion. We did get microscopic rent in the cyst wall  and could see egress with some gelatinous material, not infectious. We  removed the cyst with cautery as a single layer, made some measurements,  and sent it to lab for inspection. We then removed a pretty thick band  of scar. It was quite degenerative. Deep to that, there was some  tendinous tissue. We could see that the rotator cuff had  from  its footprint except far medial.  We freshened up the footprint. We  removed some Ethibond debris. We placed a couple of tack sutures of #2  Ethibond, one in the anterior and one posterior. We used a rongeur to  freshen up the footprint and then brought in two triple-play Arthrex  BioComposite Corkscrew anchors and placed these middle on the footprint,  but away from the articular margin and about a centimeter apart from one  another. The overall exposed footprint was about 3 x 2 cm.   We passed  the sutures from the posterior anchor to the posterior leaflet in simple  fashion, and we passed the sutures from the anterior anchor to the  anterior leaflet, again, in simple fashion. We tied our six sutures  using a Revo Knot followed by alternating half-hitches. The tissue  quality anteriorly was a little bit better than posteriorly. We then  used the Ethibond sutures to do a double pulley repair to reinforce and  bring the two ends together and this really helped. Instead, we really  had just a little bit of exposed footprint far lateral.  At this point,  I elected to reinforce the repair with the collagen patch graft. We  soaked it in saline. Using an open technique, we deployed it and then  fixed it to tendon using multiple PLLA staples and to bone using two  PEEK staples, one anterolateral and one posterolateral.  We were very  happy with our repair. The deltoid was closed routinely using 0 Vicryl  in figure-of-eight fashion along with 4-cm split away from the axillary  nerve. Subcutaneous tissues were closed using 2-0 Vicryl in  interrupted, inverted fashion. Routine skin closure with 4-0 Monocryl  and Prineo dressing was used to close the skin. Sterile compressive  dressing was applied. The arm was placed in a padded soft brace. The  patient was repositioned in the supine position before this and promptly  awakened from anesthesia having tolerated the procedure well and was  taken from the operating room to the recovery room in satisfactory  condition. PLAN:  The patient will be discharged on oral analgesics with  instructions to begin outpatient physical therapy and follow up in the  office in one week. He will be in a delayed rehab program for a couple  of weeks and then start range of motion exercise. I will speak with his  wife about our findings and change in postoperative plans.         Varinder Xiao MD    D: 02/06/2023 11:04:17       T: 02/06/2023 15:18:15     MARIA [email or web address]  Job#: [id number]     Doc#: [id number]    CC:

## 2023-02-08 ENCOUNTER — TELEPHONE (OUTPATIENT)
Dept: ORTHOPEDIC SURGERY | Age: 73
End: 2023-02-08

## 2023-02-08 NOTE — TELEPHONE ENCOUNTER
General Question     Subject: PATIENT STATES THAT HE HAS QUESTIONS REGARDING R SHOULDER SX ON Monday.  PLEASE ADVISE   Patient: Gastondior Derrell Number: [phone]

## 2023-02-10 ENCOUNTER — TELEPHONE (OUTPATIENT)
Dept: ORTHOPEDIC SURGERY | Age: 73
End: 2023-02-10

## 2023-02-10 NOTE — TELEPHONE ENCOUNTER
Auth: NPR  Date: 02/23/23 thru 05/24/23  Reference # [id number]  Spoke with: Online  Type of SX: Outpatient  Location: [facility]  CPT: 44398   DX: M25.511, M25.819  SX area: Rt shoulder  Insurance: [employer]

## 2023-02-13 ENCOUNTER — TELEPHONE (OUTPATIENT)
Dept: ORTHOPEDIC SURGERY | Age: 73
End: 2023-02-13

## 2023-02-13 NOTE — TELEPHONE ENCOUNTER
Needs a therapy protocol for this patient. Fax [phone]. Call also not positive on the fax number.    Patient is coming at 10 am today

## 2023-02-16 ENCOUNTER — OFFICE VISIT (OUTPATIENT)
Dept: ORTHOPEDIC SURGERY | Age: 73
End: 2023-02-16

## 2023-02-16 VITALS — WEIGHT: 215 LBS | HEIGHT: 68 IN | BODY MASS INDEX: 32.58 KG/M2

## 2023-02-16 DIAGNOSIS — M67.411 GANGLION, RIGHT SHOULDER: Primary | ICD-10-CM

## 2023-02-16 DIAGNOSIS — Z98.890 S/P ROTATOR CUFF REPAIR: ICD-10-CM

## 2023-02-24 NOTE — PROGRESS NOTES
History of Present Illness:  Wendy Mcgill is a 81 y.o. male who presents for a post operative visit. The patient underwent a right shoulder excision of large shoulder ganglion cyst followed by open lysis of adhesions, open removal of retained sutures, and open revision rotator cuff repair with patch augmentation on 2/6/2023 by Dr. Danis Benitez. Overall he is doing okay and feels that their pain is well controlled with current pain medications. He has been compliant with wearing the UltraSling brace at all times. The patient deny fevers, chills, numbness, tingling, and shortness of breath. Medical History:  Patient's medications, allergies, past medical, surgical, social and family histories were reviewed and updated as appropriate. No notes on file    Review of Systems  A 14 point review of systems was completed by the patient is available in the media section of the scanned medical record and was reviewed on 2/24/2023. Vital Signs: There were no vitals filed for this visit. General/Appearance: Alert and oriented and in no apparent distress. Skin:  There are no skin lesions, cellulitis, or extreme edema. The patient has warm and well-perfused Bilateral upper extremities with brisk capillary refill.      right Shoulder Exam:    Inspection: right shoulder incision that is clean, dry and intact and well approximated. The Prineo dressing is still in place. Mild ecchymosis and swelling are present as can be expected. There is no erythema, drainage or other signs of infection    Palpation:  No crepitus to gentle motion    Active Range of Motion: Deferred    Passive Range of Motion:  tolerates pendulum movements. Strength:  Deferred    Special Tests:  Deferred. Neurovascular: Sensation to light touch is intact, no motor deficits, palpable radial pulses 2+    Radiology:     Plain radiographs not obtained.           Assessment :  Mr. Wendy Mcgill is a 81 y.o. patient is s/p a right arthroscopic excision of large shoulder ganglion cyst followed by open lysis of adhesions, open removal of retained sutures, and open revision rotator cuff repair with patch augmentation. Impression:  No diagnosis found. Office Procedures:  No orders of the defined types were placed in this encounter. Treatment Plan:    Overall the patient is doing well. The pain is well-controlled. We recommend that he wear the UltraSling brace at all times with the exception of clothing, bathing of physical therapy. The patient was told that he is restricted from driving for at least 3 weeks postop. We will give a print out of their physical therapy order. All of his questions were fully answered today. We would like to see him back in 2 weeks for follow-up visit. 4:53 PM    Mani Girard PA-C  Orthopaedic Sports Medicine Physician Assistant    This dictation was performed with a verbal recognition program [facility]) and it was checked for errors. It is possible that there are still dictated errors within this office note. If so, please bring any errors to my attention for an addendum. All efforts were made to ensure that this office note is accurate.

## 2023-03-02 ENCOUNTER — OFFICE VISIT (OUTPATIENT)
Dept: ORTHOPEDIC SURGERY | Age: 73
End: 2023-03-02

## 2023-03-02 VITALS — WEIGHT: 215 LBS | BODY MASS INDEX: 32.58 KG/M2 | HEIGHT: 68 IN

## 2023-03-02 DIAGNOSIS — Z98.890 S/P ROTATOR CUFF REPAIR: Primary | ICD-10-CM

## 2023-03-02 DIAGNOSIS — Z98.890 S/P EXCISION OF GANGLION CYST: ICD-10-CM

## 2023-03-02 RX ORDER — ALBUTEROL SULFATE 90 UG/1
AEROSOL, METERED RESPIRATORY (INHALATION)
COMMUNITY
Start: 2023-01-19

## 2023-03-02 NOTE — PROGRESS NOTES
History of Present Illness:  Mj Lang is a 81 y.o. male who presents for a post operative visit. The patient underwent a right shoulder excision of large shoulder ganglion cyst followed by open lysis of adhesions, open removal of retained sutures, and open revision rotator cuff repair with patch augmentation on 2/6/2023. He is struggling to sleep due to the sling. He has been going to PT at the [facility]. He reports he does his exercises throughout the day. He has been compliant with wearing the UltraSling brace at all times. The patient denies any fevers, chills, numbness, tingling, and shortness of breath. Medical History:  Patient's medications, allergies, past medical, surgical, social and family histories were reviewed and updated as appropriate. No notes on file    Review of Systems  A 14 point review of systems was completed by the patient is available in the media section of the scanned medical record and was reviewed on 3/2/2023. Vital Signs: There were no vitals filed for this visit. General/Appearance: Alert and oriented and in no apparent distress. Skin:  There are no skin lesions, cellulitis, or extreme edema. The patient has warm and well-perfused Bilateral upper extremities with brisk capillary refill.      right Shoulder Exam:    Inspection: right shoulder incision that is clean, dry and intact and well approximated. The Prineo dressing is still in place. Mild ecchymosis and swelling are present as can be expected. There is no erythema, drainage or other signs of infection    Palpation:  No crepitus to gentle motion    Active Range of Motion: Deferred    Passive Range of Motion:  forward elevation to 90 with soft endpoint and ER of 20,     Strength:  Deferred    Special Tests:  Deferred. Neurovascular: Sensation to light touch is intact, no motor deficits, palpable radial pulses 2+    Radiology:     Plain radiographs not obtained.           Assessment :   Yamila Sheppard is a 81 y.o. patient is s/p a right open excision of large shoulder ganglion cyst followed by open lysis of adhesions, open removal of retained sutures, and open revision rotator cuff repair with patch augmentation. Impression:  Encounter Diagnoses   Name Primary? S/P rotator cuff repair Yes    S/P excision of ganglion cyst        Office Procedures:  Orders Placed This Encounter   Procedures    [facility]     Referral Priority:   Routine     Referral Type:   Eval and Treat     Referral Reason:   Specialty Services Required     Requested Specialty:   Physical Therapist     Number of Visits Requested:   1         Treatment Plan:    Overall the patient is doing well. The pain is well-controlled. Patient was told that he may discontinue the sling at home and wear it primarily when he is outdoors in big crowds. He may also resume driving a car without a sling at this time. We will continue to progress him in formal physical therapy. Therapy orders were updated today in his chart. All of his questions were fully answered today. We would like to see him back in 2 weeks for follow-up visit. 3/2/2023  11:24 AM    Lorena Ch PA-C  Orthopaedic Sports Medicine Physician Assistant    During this examination, ILorena PA-C, functioned as a scribe for Dr. Yashira Lama. This dictation was performed with a verbal recognition program (DRAGON) and it was checked for errors. It is possible that there are still dictated errors within this office note. If so, please bring any errors to my attention for an addendum. All efforts were made to ensure that this office note is accurate.   ______________  I, Dr. Yashira Lama, personally performed the services described in this documentation as described by Lorena Ch PA-C in my presence, and it is both accurate and complete. Kecia Donahue MD, PhD  3/2/2023

## 2023-05-02 ENCOUNTER — OFFICE VISIT (OUTPATIENT)
Dept: ORTHOPEDIC SURGERY | Age: 73
End: 2023-05-02

## 2023-05-02 VITALS — WEIGHT: 215 LBS | BODY MASS INDEX: 32.58 KG/M2 | HEIGHT: 68 IN

## 2023-05-02 DIAGNOSIS — Z98.890 S/P ROTATOR CUFF REPAIR: Primary | ICD-10-CM

## 2023-05-02 PROCEDURE — 99024 POSTOP FOLLOW-UP VISIT: CPT | Performed by: ORTHOPAEDIC SURGERY

## 2023-05-03 ENCOUNTER — EVALUATION (OUTPATIENT)
Dept: PHYSICAL THERAPY | Age: 73
End: 2023-05-03
Payer: MEDICARE

## 2023-05-03 DIAGNOSIS — Z98.890 S/P RIGHT ROTATOR CUFF REPAIR: ICD-10-CM

## 2023-05-03 DIAGNOSIS — M25.611 DECREASED RANGE OF MOTION OF RIGHT SHOULDER: ICD-10-CM

## 2023-05-03 DIAGNOSIS — M75.101 TEAR OF RIGHT ROTATOR CUFF, UNSPECIFIED TEAR EXTENT, UNSPECIFIED WHETHER TRAUMATIC: Primary | ICD-10-CM

## 2023-05-03 DIAGNOSIS — M25.511 RIGHT SHOULDER PAIN, UNSPECIFIED CHRONICITY: ICD-10-CM

## 2023-05-03 PROCEDURE — 97161 PT EVAL LOW COMPLEX 20 MIN: CPT | Performed by: PHYSICAL THERAPIST

## 2023-05-03 PROCEDURE — 97110 THERAPEUTIC EXERCISES: CPT | Performed by: PHYSICAL THERAPIST

## 2023-05-03 PROCEDURE — G8539 DOC FUNCT AND CARE PLAN: HCPCS | Performed by: PHYSICAL THERAPIST

## 2023-05-03 PROCEDURE — 97112 NEUROMUSCULAR REEDUCATION: CPT | Performed by: PHYSICAL THERAPIST

## 2023-05-03 PROCEDURE — G8427 DOCREV CUR MEDS BY ELIG CLIN: HCPCS | Performed by: PHYSICAL THERAPIST

## 2023-05-03 PROCEDURE — 1101F PT FALLS ASSESS-DOCD LE1/YR: CPT | Performed by: PHYSICAL THERAPIST

## 2023-05-03 NOTE — PROGRESS NOTES
[facility]   Phone: [phone]    Fax: [phone]     Physical Therapy Certification    Dear Referring Practitioner: Dr. January Montalvo,    We had the pleasure of evaluating the following patient for physical therapy services at [address] [place] [address]. A summary of our findings can be found in the initial assessment below. This includes our plan of care. If you have any questions or concerns regarding these findings, please do not hesitate to contact me at the office phone number checked above. Thank you for the referral.       Physician Signature:_______________________________Date:__________________  By signing above (or electronic signature), therapists plan is approved by physician      Patient: Jannet Sierra   : 1950   MRN: [id number]  Referring Physician: Referring Practitioner: Dr. January Montalvo      Evaluation Date: 5/3/2023      Medical Diagnosis Information:  Diagnosis: R RTC tear s/p repair; DOS: 2023                                             Insurance information: PT Insurance Information: Medicare    Precautions/ Contra-indications: s/p R RTC repair on 2023  Latex Allergy:  [x]NO      []YES    C-SSRS Triggered by Intake questionnaire (Past 2 wk assessment):   [x] No, Questionnaire did not trigger screening.   [] Yes, Patient intake triggered further evaluation      [] C-SSRS Screening completed  [] PCP notified via Plan of Care  [] Emergency services notified     Preferred Language for Healthcare:   [x]English       []other:      SUBJECTIVE: Patient stated complaint:Patient reports that he had a ganglion cyst in the R shoulder that had been attached to a RTC tear. He reports that he had a repair to the RTC in that shoulder 22 years ago. He notes that he started his postop rehab at another facility, and was not having a good experience.  Patient reports that at his last PT session they told him he would need to wait 10-15 days in

## 2023-05-03 NOTE — PROGRESS NOTES
[facility]   Phone: [phone]    Fax: [phone]      Physical Therapy Treatment Note/ Progress Report:           Date:  5/3/2023    Patient Name:  Adrian Agrawal    :  1950  MRN: [id number]  Restrictions/Precautions:    Medical/Treatment Diagnosis Information:  Diagnosis: R RTC tear s/p repair;   DOS: 2551  Insurance/Certification information:  PT Insurance Information: Medicare  Physician Information:  Referring Practitioner: Dr. Vinson Care  Has the plan of care been signed (Y/N):        []  Yes  [x]  No     Date of Patient follow up with Physician: 2023      Is this a Progress Report:     [x]  Yes  []  No        If Yes:  Date Range for reporting period:  Beginnin/3/2023  Endin/3/2023    Progress report will be due (10 Rx or 30 days whichever is less): 0/3/2960       Recertification will be due (POC Duration  / 90 days whichever is less): 6/3/2023         Visit # Insurance Allowable Auth Required   1 Med nec []  Yes [x]  No        Functional Scale: Quick DASH: 45.45% limitation   Date assessed:  5/3/2023      PQRS:   Reviewed medication list with patient. No changes since last PT visit.   5/3/2023    Latex Allergy:  [x]NO      []YES  Preferred Language for Healthcare:   [x]English       []other:      Pain level:  5-6/10     SUBJECTIVE:  See eval    OBJECTIVE: See eval            ROM PROM AROM  Comment:  5/3/2023     L R L R     Flexion     170° 152°*     Abduction     168° 132°*     ER     T3 T1*     IR     T8 T12*     Other             Other                    Strength L R Comment:  5/3/2023   Flexion 5/5       Abduction 5/5       ER 5/5       IR 5/5             Special Tests Results/Comment: deferred secondary to recent surgery    5/3/2023   Hu Olivier     Other: empty can         RESTRICTIONS/PRECAUTIONS: s/p R RTC; DOS 2023    Exercises/Interventions:     Therapeutic Ex (40217) Sets/sec Reps Notes/CUES   AD UE

## 2023-05-08 ENCOUNTER — TREATMENT (OUTPATIENT)
Dept: PHYSICAL THERAPY | Age: 73
End: 2023-05-08
Payer: MEDICARE

## 2023-05-08 DIAGNOSIS — M75.101 TEAR OF RIGHT ROTATOR CUFF, UNSPECIFIED TEAR EXTENT, UNSPECIFIED WHETHER TRAUMATIC: Primary | ICD-10-CM

## 2023-05-08 DIAGNOSIS — M25.611 DECREASED RANGE OF MOTION OF RIGHT SHOULDER: ICD-10-CM

## 2023-05-08 DIAGNOSIS — M25.511 RIGHT SHOULDER PAIN, UNSPECIFIED CHRONICITY: ICD-10-CM

## 2023-05-08 DIAGNOSIS — Z98.890 S/P RIGHT ROTATOR CUFF REPAIR: ICD-10-CM

## 2023-05-08 PROCEDURE — G8427 DOCREV CUR MEDS BY ELIG CLIN: HCPCS | Performed by: PHYSICAL THERAPIST

## 2023-05-08 PROCEDURE — 97112 NEUROMUSCULAR REEDUCATION: CPT | Performed by: PHYSICAL THERAPIST

## 2023-05-08 PROCEDURE — 97140 MANUAL THERAPY 1/> REGIONS: CPT | Performed by: PHYSICAL THERAPIST

## 2023-05-08 PROCEDURE — 97110 THERAPEUTIC EXERCISES: CPT | Performed by: PHYSICAL THERAPIST

## 2023-05-08 NOTE — PROGRESS NOTES
[facility]   Phone: [phone]    Fax: [phone]      Physical Therapy Treatment Note/ Progress Report:           Date:  2023    Patient Name:  Ivon Burrell    :  1950  MRN: [id number]  Restrictions/Precautions:    Medical/Treatment Diagnosis Information:  Diagnosis: R RTC tear s/p repair;   DOS:   Insurance/Certification information:  PT Insurance Information: Medicare  Physician Information:  Referring Practitioner: Dr. Jose Abreu  Has the plan of care been signed (Y/N):        [x]  Yes  []  No     Date of Patient follow up with Physician: 2023      Is this a Progress Report:     []  Yes  [x]  No        If Yes:  Date Range for reporting period:  Beginnin/3/2023  Endin/3/2023    Progress report will be due (10 Rx or 30 days whichever is less): 5/3/4291       Recertification will be due (POC Duration  / 90 days whichever is less): 6/3/2023         Visit # Insurance Allowable Auth Required   2 Med nec []  Yes [x]  No        Functional Scale: Quick DASH: 45.45% limitation   Date assessed:  5/3/2023      PQRS:   Reviewed medication list with patient. No changes since last PT visit. 2023    Latex Allergy:  [x]NO      []YES  Preferred Language for Healthcare:   [x]English       []other:      Pain level:  4-5/10     SUBJECTIVE:  Patient reports that the R shoulder seems to be doing a bit better as he is doing more now. He reports that the exercises are going well, with no complaints. Minor pain is still present in the lateral aspect of the R shoulder.       13 weeks postop    OBJECTIVE: See eval            ROM PROM AROM  Comment:  5/3/2023     L R L R     Flexion     170° 152°*     Abduction     168° 132°*     ER     T3 T1*     IR     T8 T12*     Other             Other                    Strength L R Comment:  5/3/2023   Flexion 5/5       Abduction 5/5       ER 5/5       IR 5/5             Special Tests Results/Comment: deferred

## 2023-05-11 ENCOUNTER — TREATMENT (OUTPATIENT)
Dept: PHYSICAL THERAPY | Age: 73
End: 2023-05-11

## 2023-05-11 DIAGNOSIS — Z98.890 S/P RIGHT ROTATOR CUFF REPAIR: ICD-10-CM

## 2023-05-11 DIAGNOSIS — M25.611 DECREASED RANGE OF MOTION OF RIGHT SHOULDER: ICD-10-CM

## 2023-05-11 DIAGNOSIS — M75.101 TEAR OF RIGHT ROTATOR CUFF, UNSPECIFIED TEAR EXTENT, UNSPECIFIED WHETHER TRAUMATIC: Primary | ICD-10-CM

## 2023-05-11 DIAGNOSIS — M25.511 RIGHT SHOULDER PAIN, UNSPECIFIED CHRONICITY: ICD-10-CM

## 2023-05-11 NOTE — PROGRESS NOTES
[facility]   Phone: [phone]    Fax: [phone]      Physical Therapy Treatment Note/ Progress Report:           Date:  2023    Patient Name:  Fabiola Skiff    :  1950  MRN: [id number]  Restrictions/Precautions:    Medical/Treatment Diagnosis Information:  Diagnosis: R RTC tear s/p repair;   DOS: 4332  Insurance/Certification information:  PT Insurance Information: Medicare  Physician Information:  Referring Practitioner: Dr. Frederick Braun  Has the plan of care been signed (Y/N):        [x]  Yes  []  No     Date of Patient follow up with Physician: 2023      Is this a Progress Report:     []  Yes  [x]  No        If Yes:  Date Range for reporting period:  Beginnin/3/2023  Endin/3/2023    Progress report will be due (10 Rx or 30 days whichever is less): 5336       Recertification will be due (POC Duration  / 90 days whichever is less): 6/3/2023         Visit # Insurance Allowable Auth Required   3 [facility] []  Yes [x]  No        Functional Scale: Quick DASH: 45.45% limitation   Date assessed:  5/3/2023      PQRS:   Reviewed medication list with patient. No changes since last PT visit. 2023    Latex Allergy:  [x]NO      []YES  Preferred Language for Healthcare:   [x]English       []other:      Pain level:  4-5/10     SUBJECTIVE:  Patient reports a minor increase in lateral R upper arm pain. He notes that this pain occurs when trying to raise the R arm out to the side. He notes that the exercises continue to go well at home.       >13 weeks postop    OBJECTIVE: See eval            ROM PROM AROM  Comment:  5/3/2023     L R L R     Flexion     170° 152°*     Abduction     168° 132°*     ER     T3 T1*     IR     T8 T12*     Other             Other                    Strength L R Comment:  5/3/2023   Flexion 5/5       Abduction 5/5       ER 5/5       IR 5/5             Special Tests Results/Comment: deferred secondary to recent surgery

## 2023-05-16 ENCOUNTER — TREATMENT (OUTPATIENT)
Dept: PHYSICAL THERAPY | Age: 73
End: 2023-05-16
Payer: MEDICARE

## 2023-05-16 DIAGNOSIS — M75.101 TEAR OF RIGHT ROTATOR CUFF, UNSPECIFIED TEAR EXTENT, UNSPECIFIED WHETHER TRAUMATIC: Primary | ICD-10-CM

## 2023-05-16 DIAGNOSIS — M25.511 RIGHT SHOULDER PAIN, UNSPECIFIED CHRONICITY: ICD-10-CM

## 2023-05-16 DIAGNOSIS — M25.611 DECREASED RANGE OF MOTION OF RIGHT SHOULDER: ICD-10-CM

## 2023-05-16 DIAGNOSIS — Z98.890 S/P RIGHT ROTATOR CUFF REPAIR: ICD-10-CM

## 2023-05-16 PROCEDURE — G8427 DOCREV CUR MEDS BY ELIG CLIN: HCPCS | Performed by: PHYSICAL THERAPIST

## 2023-05-16 PROCEDURE — 97140 MANUAL THERAPY 1/> REGIONS: CPT | Performed by: PHYSICAL THERAPIST

## 2023-05-16 PROCEDURE — 97110 THERAPEUTIC EXERCISES: CPT | Performed by: PHYSICAL THERAPIST

## 2023-05-16 PROCEDURE — 97530 THERAPEUTIC ACTIVITIES: CPT | Performed by: PHYSICAL THERAPIST

## 2023-05-16 NOTE — PROGRESS NOTES
Treatment Minutes: 48      [] EVAL (LOW) 36470 (typically 20 minutes face-to-face)   [] EVAL (MOD) 70851 (typically 30 minutes face-to-face)  [] EVAL (HIGH) 76440 (typically 45 minutes face-to-face)  [] RE-EVAL     [x] PS(09484) x 1  (18') [] IONTO  [] NMR (49828) x   [] VASO  [x] Manual (05165) x  1 (8')   [] Other:  [x] TA x 1 (12')   [] Mech Traction (89102)  [] ES(attended) (04356)      [] ES (un) (30440):         ASSESSMENT: Patient presents with reports of increased soreness in the lateral R upper arm, and requested starting with manual STM by PT. Mild muscle soreness and tightness noted in the lateral deltoid today, and moderate palpable muscle tightness noted in the R UT today, noted during manual STM by PT. He fatigues moderately with TB exercises, ball on wall, and side-lying ER. Side-lying abduction is tolerated well today, and patient reports no pain in R shoulder. He states that he feels better at the end of session today compared to his arrival.      GOALS:    Patient stated goal: Regain full motion and strength in the R shoulder  [] Progressing: [] Met: [] Not Met: [] Adjusted    Therapist goals for Patient:   Short Term Goals: To be achieved in: 2 weeks  1. Independent in HEP and progression per patient tolerance, in order to prevent re-injury. [] Progressing: [] Met: [] Not Met: [] Adjusted  2. Patient will have a decrease in pain to facilitate improvement in movement, function, and ADLs as indicated by Functional Deficits. [] Progressing: [] Met: [] Not Met: [] Adjusted    Long Term Goals: To be achieved in: 12 weeks  1. Disability index score of 20% or less for the Quick DASH to assist with reaching prior level of function. [] Progressing: [] Met: [] Not Met: [] Adjusted  2. Patient will demonstrate increased R shoulder flexion, extension, ER, and IR AROM to >160°, >160°, T3, and T9 respectively to allow for proper joint functioning as indicated by patients Functional Deficits.    []

## 2023-05-18 ENCOUNTER — TREATMENT (OUTPATIENT)
Dept: PHYSICAL THERAPY | Age: 73
End: 2023-05-18

## 2023-05-18 DIAGNOSIS — M75.101 TEAR OF RIGHT ROTATOR CUFF, UNSPECIFIED TEAR EXTENT, UNSPECIFIED WHETHER TRAUMATIC: Primary | ICD-10-CM

## 2023-05-18 DIAGNOSIS — M25.511 RIGHT SHOULDER PAIN, UNSPECIFIED CHRONICITY: ICD-10-CM

## 2023-05-18 DIAGNOSIS — Z98.890 S/P RIGHT ROTATOR CUFF REPAIR: ICD-10-CM

## 2023-05-18 DIAGNOSIS — M25.611 DECREASED RANGE OF MOTION OF RIGHT SHOULDER: ICD-10-CM

## 2023-05-18 NOTE — PROGRESS NOTES
[facility]   Phone: [phone]    Fax: [phone]      Physical Therapy Treatment Note/ Progress Report:           Date:  2023    Patient Name:  Deyanira Zhao    :  1950  MRN: [id number]  Restrictions/Precautions:    Medical/Treatment Diagnosis Information:  Diagnosis: R RTC tear s/p repair; (M75.101, Z98.890)  R shoulder Pain (M25.511)  DOS: 8/3/2732  Insurance/Certification information:  PT Insurance Information: Medicare  Physician Information:  Referring Practitioner: Dr. Gabriel Reid  Has the plan of care been signed (Y/N):        [x]  Yes  []  No     Date of Patient follow up with Physician: 2023      Is this a Progress Report:     []  Yes  [x]  No        If Yes:  Date Range for reporting period:  Beginnin/3/2023  Endin/3/2023    Progress report will be due (10 Rx or 30 days whichever is less): 477       Recertification will be due (POC Duration  / 90 days whichever is less): 6/3/2023         Visit # Insurance Allowable Auth Required   5 Med nec []  Yes [x]  No        Functional Scale: Quick DASH: 45.45% limitation   Date assessed:  5/3/2023      PQRS:   Reviewed medication list with patient. No changes since last PT visit. 2023    Latex Allergy:  [x]NO      []YES  Preferred Language for Healthcare:   [x]English       []other:      Pain level:  4-5/10     SUBJECTIVE:  Patient reports that he still has moderate soreness in the lateral shoulder and R upper arm, but it is better than last session. He notes that he slept on the R side last night, which he feels really causes the soreness and stiffness.        >14 weeks postop    OBJECTIVE: See eval            ROM PROM AROM  Comment:  5/3/2023     L R L R     Flexion     170° 152°*     Abduction     168° 132°*     ER     T3 T1*     IR     T8 T12*     Other             Other                    Strength L R Comment:  5/3/2023   Flexion 5/5       Abduction 5/5       ER 5/5       IR 5/5

## 2023-05-23 ENCOUNTER — TREATMENT (OUTPATIENT)
Dept: PHYSICAL THERAPY | Age: 73
End: 2023-05-23
Payer: MEDICARE

## 2023-05-23 DIAGNOSIS — M75.101 TEAR OF RIGHT ROTATOR CUFF, UNSPECIFIED TEAR EXTENT, UNSPECIFIED WHETHER TRAUMATIC: Primary | ICD-10-CM

## 2023-05-23 DIAGNOSIS — M25.511 RIGHT SHOULDER PAIN, UNSPECIFIED CHRONICITY: ICD-10-CM

## 2023-05-23 DIAGNOSIS — M25.611 DECREASED RANGE OF MOTION OF RIGHT SHOULDER: ICD-10-CM

## 2023-05-23 DIAGNOSIS — Z98.890 S/P RIGHT ROTATOR CUFF REPAIR: ICD-10-CM

## 2023-05-23 PROCEDURE — 97140 MANUAL THERAPY 1/> REGIONS: CPT | Performed by: PHYSICAL THERAPIST

## 2023-05-23 PROCEDURE — 97110 THERAPEUTIC EXERCISES: CPT | Performed by: PHYSICAL THERAPIST

## 2023-05-23 PROCEDURE — 97530 THERAPEUTIC ACTIVITIES: CPT | Performed by: PHYSICAL THERAPIST

## 2023-05-23 PROCEDURE — G8427 DOCREV CUR MEDS BY ELIG CLIN: HCPCS | Performed by: PHYSICAL THERAPIST

## 2023-05-23 NOTE — PROGRESS NOTES
control. Charges:  Timed Code Treatment Minutes: 48   Total Treatment Minutes: 58      [] EVAL (LOW) 66767 (typically 20 minutes face-to-face)   [] EVAL (MOD) 63906 (typically 30 minutes face-to-face)  [] EVAL (HIGH) 33275 (typically 45 minutes face-to-face)  [] RE-EVAL     [x] CF(22076) x 1  (22') [] IONTO  [] NMR (91671) x   [] VASO  [x] Manual (98130) x  1 (10')  [] Other:  [x] TA x 1 (16')   [] Mech Traction (11899)  [] ES(attended) (47897)      [] ES (un) (48739):         ASSESSMENT: Patient tolerates new exercises well today with no reports of increased pain in the R shoulder. He fatigues significantly with eccentric deltoid exercise today. Order of exercises was changed today to complete table exercises first, followed by standing strengthening and stretches. He fatigue to a greater level with this order of exercises. Plan to progress activity per patient tolerance and postop protocol. GOALS:    Patient stated goal: Regain full motion and strength in the R shoulder  [] Progressing: [] Met: [] Not Met: [] Adjusted    Therapist goals for Patient:   Short Term Goals: To be achieved in: 2 weeks  1. Independent in HEP and progression per patient tolerance, in order to prevent re-injury. [] Progressing: [] Met: [] Not Met: [] Adjusted  2. Patient will have a decrease in pain to facilitate improvement in movement, function, and ADLs as indicated by Functional Deficits. [] Progressing: [] Met: [] Not Met: [] Adjusted    Long Term Goals: To be achieved in: 12 weeks  1. Disability index score of 20% or less for the Quick DASH to assist with reaching prior level of function. [] Progressing: [] Met: [] Not Met: [] Adjusted  2. Patient will demonstrate increased R shoulder flexion, extension, ER, and IR AROM to >160°, >160°, T3, and T9 respectively to allow for proper joint functioning as indicated by patients Functional Deficits. [] Progressing: [] Met: [] Not Met: [] Adjusted  3.  Patient will demonstrate

## 2023-05-30 ENCOUNTER — TREATMENT (OUTPATIENT)
Dept: PHYSICAL THERAPY | Age: 73
End: 2023-05-30

## 2023-05-30 DIAGNOSIS — Z98.890 S/P RIGHT ROTATOR CUFF REPAIR: ICD-10-CM

## 2023-05-30 DIAGNOSIS — M75.101 TEAR OF RIGHT ROTATOR CUFF, UNSPECIFIED TEAR EXTENT, UNSPECIFIED WHETHER TRAUMATIC: Primary | ICD-10-CM

## 2023-05-30 DIAGNOSIS — M25.511 RIGHT SHOULDER PAIN, UNSPECIFIED CHRONICITY: ICD-10-CM

## 2023-05-30 DIAGNOSIS — M25.611 DECREASED RANGE OF MOTION OF RIGHT SHOULDER: ICD-10-CM

## 2023-05-30 NOTE — PROGRESS NOTES
[facility]   Phone: [phone]    Fax: [phone]            Physical Therapy  Cancellation/No-show Note  Patient Name:  Padmini Guzman  :  1950   Date:  2023  Cancelled visits to date: 1  No-shows to date: 0    For today's appointment patient:  [x]  Cancelled  []  Rescheduled appointment  []  No-show     Reason given by patient:  []  Patient ill  []  Conflicting appointment  []  No transportation    []  Conflict with work  []  No reason given  [x]  Other: Patient had wrong appointment time     Comments:      Phone call information:   []  Phone call made today to patient at _ time at number provided:      []  Patient answered, conversation as follows:    []  Patient did not answer, message left as follows:  []  Phone call not made today  [x]  Phone call not needed - pt contacted us to cancel and provided reason for cancellation.      Electronically signed by:  Lilliana Butler, PT, DPT, OCS, OMT-C    Board-Certified Orthopaedic Clinical Specialist    [address] [address] PT license: [id number]  [place] PT license: [id number]

## 2023-06-01 ENCOUNTER — TREATMENT (OUTPATIENT)
Dept: PHYSICAL THERAPY | Age: 73
End: 2023-06-01

## 2023-06-01 DIAGNOSIS — Z98.890 S/P RIGHT ROTATOR CUFF REPAIR: ICD-10-CM

## 2023-06-01 DIAGNOSIS — M25.611 DECREASED RANGE OF MOTION OF RIGHT SHOULDER: ICD-10-CM

## 2023-06-01 DIAGNOSIS — M25.511 RIGHT SHOULDER PAIN, UNSPECIFIED CHRONICITY: ICD-10-CM

## 2023-06-01 DIAGNOSIS — M75.101 TEAR OF RIGHT ROTATOR CUFF, UNSPECIFIED TEAR EXTENT, UNSPECIFIED WHETHER TRAUMATIC: Primary | ICD-10-CM

## 2023-06-01 NOTE — PROGRESS NOTES
[facility]   Phone: [phone]    Fax: [phone]     Physical Therapy Re-Certification Plan of Care    Dear Dr. Sera Ray,    We had the pleasure of treating the following patient for physical therapy services at [address] [place] [address]. A summary of our findings can be found in the updated assessment below. This includes our plan of care. If you have any questions or concerns regarding these findings, please do not hesitate to contact me at the office phone number checked above.   Thank you for the referral.     Physician Signature:________________________________Date:__________________  By signing above (or electronic signature), therapists plan is approved by physician      Overall Response to Treatment:   [x]Patient is responding well to treatment and improvement is noted with regards  to goals   [x]Patient should continue to improve in reasonable time if they continue HEP   []Patient has plateaued and is no longer responding to skilled PT intervention    []Patient is getting worse and would benefit from return to referring MD   []Patient unable to adhere to initial POC   []Other:     Physical Therapy Treatment Note/ Progress Report:           Date:  2023    Patient Name:  Irineo Cornejo    :  1950  MRN: [id number]  Restrictions/Precautions:    Medical/Treatment Diagnosis Information:  Diagnosis: R RTC tear s/p repair; (M75.101, Z98.890)  R shoulder Pain (M25.511)  DOS: 3105  Insurance/Certification information:  PT Insurance Information: Medicare  Physician Information:  Referring Practitioner: Dr. Sera Ray  Has the plan of care been signed (Y/N):        []  Yes  [x]  No     Date of Patient follow up with Physician: 2023      Is this a Progress Report:     [x]  Yes  []  No        If Yes:  Date Range for reporting period:  Beginnin/3/2023 Update NPV  Endin2023    Progress report will be due (10 Rx or 30 days whichever is

## 2023-06-06 ENCOUNTER — TREATMENT (OUTPATIENT)
Dept: PHYSICAL THERAPY | Age: 73
End: 2023-06-06
Payer: MEDICARE

## 2023-06-06 DIAGNOSIS — Z98.890 S/P RIGHT ROTATOR CUFF REPAIR: ICD-10-CM

## 2023-06-06 DIAGNOSIS — M25.511 RIGHT SHOULDER PAIN, UNSPECIFIED CHRONICITY: ICD-10-CM

## 2023-06-06 DIAGNOSIS — M75.101 TEAR OF RIGHT ROTATOR CUFF, UNSPECIFIED TEAR EXTENT, UNSPECIFIED WHETHER TRAUMATIC: Primary | ICD-10-CM

## 2023-06-06 DIAGNOSIS — M25.611 DECREASED RANGE OF MOTION OF RIGHT SHOULDER: ICD-10-CM

## 2023-06-06 PROCEDURE — 97110 THERAPEUTIC EXERCISES: CPT | Performed by: PHYSICAL THERAPIST

## 2023-06-06 PROCEDURE — 97530 THERAPEUTIC ACTIVITIES: CPT | Performed by: PHYSICAL THERAPIST

## 2023-06-06 PROCEDURE — G8427 DOCREV CUR MEDS BY ELIG CLIN: HCPCS | Performed by: PHYSICAL THERAPIST

## 2023-06-06 PROCEDURE — 97140 MANUAL THERAPY 1/> REGIONS: CPT | Performed by: PHYSICAL THERAPIST

## 2023-06-06 NOTE — PROGRESS NOTES
[place]    Board-Certified Orthopaedic Clinical Specialist    [address] [address] PT license: [id number]  [place] PT license: [id number]      Note: If patient does not return for scheduled/ recommended follow up visits, this note will serve as a discharge from care along with most recent update on progress.

## 2023-06-08 ENCOUNTER — TREATMENT (OUTPATIENT)
Dept: PHYSICAL THERAPY | Age: 73
End: 2023-06-08

## 2023-06-08 ENCOUNTER — OFFICE VISIT (OUTPATIENT)
Dept: ORTHOPEDIC SURGERY | Age: 73
End: 2023-06-08

## 2023-06-08 VITALS — BODY MASS INDEX: 32.58 KG/M2 | WEIGHT: 215 LBS | HEIGHT: 68 IN

## 2023-06-08 DIAGNOSIS — M75.101 TEAR OF RIGHT ROTATOR CUFF, UNSPECIFIED TEAR EXTENT, UNSPECIFIED WHETHER TRAUMATIC: Primary | ICD-10-CM

## 2023-06-08 DIAGNOSIS — Z98.890 S/P EXCISION OF GANGLION CYST: ICD-10-CM

## 2023-06-08 DIAGNOSIS — M25.511 RIGHT SHOULDER PAIN, UNSPECIFIED CHRONICITY: ICD-10-CM

## 2023-06-08 DIAGNOSIS — Z98.890 S/P SHOULDER SURGERY: ICD-10-CM

## 2023-06-08 DIAGNOSIS — Z98.890 S/P ROTATOR CUFF REPAIR: Primary | ICD-10-CM

## 2023-06-08 DIAGNOSIS — Z98.890 S/P RIGHT ROTATOR CUFF REPAIR: ICD-10-CM

## 2023-06-08 DIAGNOSIS — M25.611 DECREASED RANGE OF MOTION OF RIGHT SHOULDER: ICD-10-CM

## 2023-06-08 NOTE — PROGRESS NOTES
[facility]   Phone: [phone]    Fax: [phone]        Physical Therapy Treatment Note/ Progress Report:           Date:  2023    Patient Name:  Moy Goodman    :  1950  MRN: [id number]  Restrictions/Precautions:    Medical/Treatment Diagnosis Information:  Diagnosis: R RTC tear s/p repair; (M75.101, Z98.890)  R shoulder Pain (M25.511)  DOS: 1978  Insurance/Certification information:  PT Insurance Information: Medicare  Physician Information:  Referring Practitioner: Dr. Josiah Queen  Has the plan of care been signed (Y/N):        []  Yes  [x]  No     Date of Patient follow up with Physician: 2023      Is this a Progress Report:     []  Yes  [x]  No        If Yes:  Date Range for reporting period:  Beginnin2023   Endin2023    Progress report will be due (10 Rx or 30 days whichever is less): 477       Recertification will be due (POC Duration  / 90 days whichever is less): 2023         Visit # Insurance Allowable Auth Required   9 Med nec []  Yes [x]  No        Functional Scale:      Quick DASH: 45.45% limitation  Date assessed:  5/3/2023       Quick DASH: 18.18% limitation  Date assessed:  2023      PQRS:   Reviewed medication list with patient. No changes since last PT visit. 2023    Latex Allergy:  [x]NO      []YES  Preferred Language for Healthcare:   [x]English       []other:      Pain level:  4-5/10     SUBJECTIVE:  Patient reports that he is still getting the pain in the lateral aspect of the R upper arm, and this continues to frustrate him. He notes that overall he feels he is improving, but this is longer recovery than he had initially thought he was going to have.       >17 weeks postop    OBJECTIVE: See eval            ROM PROM AROM  Comment:  2023     L R L R     Flexion     170° 163°*     Abduction     168° 164°*     ER     T3 T2*     IR    T8 T8 T10*     Other             Other

## 2023-06-08 NOTE — PROGRESS NOTES
12 [facility]  History and Physical  Shoulder Pain    Date:  2023    Name:  Sherryl Goodell  Address:  [address] [place] [id number]    :  1950      Age:   81 y.o.    SSN:  [id number]      Medical Record Number:  [id number]    Reason for Visit:    Shoulder Pain (F/U RIGHT SHOULDER)      HPI:   Sherryl Goodell is a 81 y.o. male who presents to our office today for follow up of the right shoulder pain. He underwent right shoulder ganglion cyst excision followed by open revision rotator cuff repair with patch augmentation on 2023. He switched to going to physical therapy to the [facility] and find the rehab to be better and feels challenged. He is happy with his progress. He would like to return back to bowling and tossing a softball. He denies any new injuries. Pain Assessment  Location of Pain: Shoulder  Location Modifiers: Right  Severity of Pain: 2  Quality of Pain: Aching  Frequency of Pain: Intermittent  Aggravating Factors: Other (Comment), Straightening, Stretching, Exercise  Limiting Behavior: Some  Relieving Factors: Rest, Exercise  Work-Related Injury: No  Are there other pain locations you wish to document?: No    No notes on file    Review of Systems:  A 14 point review of systems available in the scanned medical record as documented by the patient and reviewed on 2023. The review is negative with the exception of those things mentioned in the History of Present Illness and Past Medical History. Past Medical History:  Patient's medications, allergies, past medical, surgical, social and family histories were reviewed and updated as appropriate. Allergies: Allergies   Allergen Reactions    Hydrocodone-Acetaminophen Swelling and Other (See Comments)     faints        Penicillins Anaphylaxis     Patient states I can take Cephalosporins. Statins Other (See Comments)     Flu like symptoms    Other reaction(s):  Other

## 2023-06-20 ENCOUNTER — TREATMENT (OUTPATIENT)
Dept: PHYSICAL THERAPY | Age: 73
End: 2023-06-20
Payer: MEDICARE

## 2023-06-20 DIAGNOSIS — M25.511 RIGHT SHOULDER PAIN, UNSPECIFIED CHRONICITY: ICD-10-CM

## 2023-06-20 DIAGNOSIS — M25.611 DECREASED RANGE OF MOTION OF RIGHT SHOULDER: ICD-10-CM

## 2023-06-20 DIAGNOSIS — M75.101 TEAR OF RIGHT ROTATOR CUFF, UNSPECIFIED TEAR EXTENT, UNSPECIFIED WHETHER TRAUMATIC: Primary | ICD-10-CM

## 2023-06-20 DIAGNOSIS — Z98.890 S/P RIGHT ROTATOR CUFF REPAIR: ICD-10-CM

## 2023-06-20 PROCEDURE — 97110 THERAPEUTIC EXERCISES: CPT | Performed by: PHYSICAL THERAPIST

## 2023-06-20 PROCEDURE — 97140 MANUAL THERAPY 1/> REGIONS: CPT | Performed by: PHYSICAL THERAPIST

## 2023-06-20 PROCEDURE — G8427 DOCREV CUR MEDS BY ELIG CLIN: HCPCS | Performed by: PHYSICAL THERAPIST

## 2023-06-20 PROCEDURE — 97530 THERAPEUTIC ACTIVITIES: CPT | Performed by: PHYSICAL THERAPIST

## 2023-06-20 NOTE — PROGRESS NOTES
[facility]   Phone: [phone]    Fax: [phone]        Physical Therapy Treatment Note/ Progress Report:           Date:  2023    Patient Name:  Janusz James    :  1950  MRN: [id number]  Restrictions/Precautions:    Medical/Treatment Diagnosis Information:  Diagnosis: R RTC tear s/p repair; (M75.101, Z98.890)  R shoulder Pain (M25.511)  DOS: 8923  Insurance/Certification information:  PT Insurance Information: Medicare  Physician Information:  Referring Practitioner: Dr. Veronica Mckeon  Has the plan of care been signed (Y/N):        []  Yes  [x]  No     Date of Patient follow up with Physician: 8/10/2023      Is this a Progress Report:     []  Yes  [x]  No        If Yes:  Date Range for reporting period:  Beginnin2023   Endin2023    Progress report will be due (10 Rx or 30 days whichever is less): 1544       Recertification will be due (POC Duration  / 90 days whichever is less): 2023         Visit # Insurance Allowable Auth Required   [address] []  Yes [x]  No        Functional Scale:      Quick DASH: 45.45% limitation  Date assessed:  5/3/2023       Quick DASH: 18.18% limitation  Date assessed:  2023      PQRS:   Reviewed medication list with patient. No changes since last PT visit. 2023    Latex Allergy:  [x]NO      []YES  Preferred Language for Healthcare:   [x]English       []other:      Pain level:  4-5/10     SUBJECTIVE:  Patient reports that he was in [facility] all last week and attended 8 of his granddaughter's [employer] games. He had a 5 hour drive yesterday and then had a fall this morning, tripping over his daughters dog. He  states that he caught himself on the bed with the R arm and fell into the wall with the L shoulder. As a result of travel, driving, and the fall, he feels a bit more sore this afternoon, but he is able to raise the R arm and still do all activity without new pain or issues.       >17 weeks

## 2023-06-27 ENCOUNTER — TREATMENT (OUTPATIENT)
Dept: PHYSICAL THERAPY | Age: 73
End: 2023-06-27
Payer: MEDICARE

## 2023-06-27 DIAGNOSIS — M75.101 TEAR OF RIGHT ROTATOR CUFF, UNSPECIFIED TEAR EXTENT, UNSPECIFIED WHETHER TRAUMATIC: Primary | ICD-10-CM

## 2023-06-27 DIAGNOSIS — M25.611 DECREASED RANGE OF MOTION OF RIGHT SHOULDER: ICD-10-CM

## 2023-06-27 DIAGNOSIS — Z98.890 S/P RIGHT ROTATOR CUFF REPAIR: ICD-10-CM

## 2023-06-27 DIAGNOSIS — M25.511 RIGHT SHOULDER PAIN, UNSPECIFIED CHRONICITY: ICD-10-CM

## 2023-06-27 PROCEDURE — G8427 DOCREV CUR MEDS BY ELIG CLIN: HCPCS | Performed by: PHYSICAL THERAPIST

## 2023-06-27 PROCEDURE — 97530 THERAPEUTIC ACTIVITIES: CPT | Performed by: PHYSICAL THERAPIST

## 2023-06-27 PROCEDURE — 97110 THERAPEUTIC EXERCISES: CPT | Performed by: PHYSICAL THERAPIST

## 2023-06-27 PROCEDURE — 97140 MANUAL THERAPY 1/> REGIONS: CPT | Performed by: PHYSICAL THERAPIST

## 2023-07-03 ENCOUNTER — TREATMENT (OUTPATIENT)
Dept: PHYSICAL THERAPY | Age: 73
End: 2023-07-03
Payer: MEDICARE

## 2023-07-03 DIAGNOSIS — M75.101 TEAR OF RIGHT ROTATOR CUFF, UNSPECIFIED TEAR EXTENT, UNSPECIFIED WHETHER TRAUMATIC: Primary | ICD-10-CM

## 2023-07-03 DIAGNOSIS — M25.611 DECREASED RANGE OF MOTION OF RIGHT SHOULDER: ICD-10-CM

## 2023-07-03 DIAGNOSIS — M25.511 RIGHT SHOULDER PAIN, UNSPECIFIED CHRONICITY: ICD-10-CM

## 2023-07-03 DIAGNOSIS — Z98.890 S/P RIGHT ROTATOR CUFF REPAIR: ICD-10-CM

## 2023-07-03 PROCEDURE — G8427 DOCREV CUR MEDS BY ELIG CLIN: HCPCS | Performed by: PHYSICAL THERAPIST

## 2023-07-03 PROCEDURE — 97110 THERAPEUTIC EXERCISES: CPT | Performed by: PHYSICAL THERAPIST

## 2023-07-03 PROCEDURE — 97140 MANUAL THERAPY 1/> REGIONS: CPT | Performed by: PHYSICAL THERAPIST

## 2023-07-03 PROCEDURE — 97530 THERAPEUTIC ACTIVITIES: CPT | Performed by: PHYSICAL THERAPIST

## 2023-07-11 ENCOUNTER — TREATMENT (OUTPATIENT)
Dept: PHYSICAL THERAPY | Age: 73
End: 2023-07-11
Payer: MEDICARE

## 2023-07-11 DIAGNOSIS — Z98.890 S/P RIGHT ROTATOR CUFF REPAIR: ICD-10-CM

## 2023-07-11 DIAGNOSIS — M75.101 TEAR OF RIGHT ROTATOR CUFF, UNSPECIFIED TEAR EXTENT, UNSPECIFIED WHETHER TRAUMATIC: Primary | ICD-10-CM

## 2023-07-11 DIAGNOSIS — M25.611 DECREASED RANGE OF MOTION OF RIGHT SHOULDER: ICD-10-CM

## 2023-07-11 DIAGNOSIS — M25.511 RIGHT SHOULDER PAIN, UNSPECIFIED CHRONICITY: ICD-10-CM

## 2023-07-11 PROCEDURE — 97140 MANUAL THERAPY 1/> REGIONS: CPT | Performed by: PHYSICAL THERAPIST

## 2023-07-11 PROCEDURE — 97110 THERAPEUTIC EXERCISES: CPT | Performed by: PHYSICAL THERAPIST

## 2023-07-11 PROCEDURE — 97530 THERAPEUTIC ACTIVITIES: CPT | Performed by: PHYSICAL THERAPIST

## 2023-07-11 PROCEDURE — G8427 DOCREV CUR MEDS BY ELIG CLIN: HCPCS | Performed by: PHYSICAL THERAPIST

## 2023-07-18 ENCOUNTER — TREATMENT (OUTPATIENT)
Dept: PHYSICAL THERAPY | Age: 73
End: 2023-07-18
Payer: MEDICARE

## 2023-07-18 DIAGNOSIS — M25.511 RIGHT SHOULDER PAIN, UNSPECIFIED CHRONICITY: ICD-10-CM

## 2023-07-18 DIAGNOSIS — Z98.890 S/P RIGHT ROTATOR CUFF REPAIR: ICD-10-CM

## 2023-07-18 DIAGNOSIS — M75.101 TEAR OF RIGHT ROTATOR CUFF, UNSPECIFIED TEAR EXTENT, UNSPECIFIED WHETHER TRAUMATIC: Primary | ICD-10-CM

## 2023-07-18 DIAGNOSIS — M25.611 DECREASED RANGE OF MOTION OF RIGHT SHOULDER: ICD-10-CM

## 2023-07-18 PROCEDURE — 97530 THERAPEUTIC ACTIVITIES: CPT | Performed by: PHYSICAL THERAPIST

## 2023-07-18 PROCEDURE — 97140 MANUAL THERAPY 1/> REGIONS: CPT | Performed by: PHYSICAL THERAPIST

## 2023-07-18 PROCEDURE — G8427 DOCREV CUR MEDS BY ELIG CLIN: HCPCS | Performed by: PHYSICAL THERAPIST

## 2023-07-18 PROCEDURE — 97110 THERAPEUTIC EXERCISES: CPT | Performed by: PHYSICAL THERAPIST

## 2023-07-25 ENCOUNTER — TREATMENT (OUTPATIENT)
Dept: PHYSICAL THERAPY | Age: 73
End: 2023-07-25
Payer: MEDICARE

## 2023-07-25 DIAGNOSIS — M75.101 TEAR OF RIGHT ROTATOR CUFF, UNSPECIFIED TEAR EXTENT, UNSPECIFIED WHETHER TRAUMATIC: Primary | ICD-10-CM

## 2023-07-25 DIAGNOSIS — Z98.890 S/P RIGHT ROTATOR CUFF REPAIR: ICD-10-CM

## 2023-07-25 DIAGNOSIS — M25.611 DECREASED RANGE OF MOTION OF RIGHT SHOULDER: ICD-10-CM

## 2023-07-25 DIAGNOSIS — M25.511 RIGHT SHOULDER PAIN, UNSPECIFIED CHRONICITY: ICD-10-CM

## 2023-07-25 PROCEDURE — 97140 MANUAL THERAPY 1/> REGIONS: CPT | Performed by: PHYSICAL THERAPIST

## 2023-07-25 PROCEDURE — G8427 DOCREV CUR MEDS BY ELIG CLIN: HCPCS | Performed by: PHYSICAL THERAPIST

## 2023-07-25 PROCEDURE — 97110 THERAPEUTIC EXERCISES: CPT | Performed by: PHYSICAL THERAPIST

## 2023-07-25 PROCEDURE — 97530 THERAPEUTIC ACTIVITIES: CPT | Performed by: PHYSICAL THERAPIST

## 2023-07-25 NOTE — PROGRESS NOTES
for proper ROM for normal function with self care, reaching, carrying, lifting, house/yardwork, driving/computer work    Modalities:  CP to R shoulder x10'   [] GAME READY (VASO)- for significant edema, swelling, pain control. Charges:  Timed Code Treatment Minutes: 40   Total Treatment Minutes: 50      [] EVAL (LOW) 36685 (typically 20 minutes face-to-face)   [] EVAL (MOD) 66939 (typically 30 minutes face-to-face)  [] EVAL (HIGH) 95575 (typically 45 minutes face-to-face)  [] RE-EVAL     [x] OD(24340) x 1  (14') [] IONTO  [] NMR (62025) x   [] VASO  [x] Manual (32887) x  1 (8')  [] Other:  [x] TA x 1 (18')   [] Mech Traction (83954)  [] ES(attended) (91476)     [] ES (un) (38611):         ASSESSMENT: Patient exhibits significantly less palpable muscle tightness in the R shoulder complex today. He reports decreased pain with all exercises today. Moderate cuing required for form with alternating flexion and scaption exercises today. Significant fatigue noted following alternating flexion and scaption as well as side lying abduction. Boris is progressing well in strength and function. GOALS:    Patient stated goal: Regain full motion and strength in the R shoulder  [x] Progressing: [] Met: [] Not Met: [] Adjusted    Therapist goals for Patient:   Short Term Goals: To be achieved in: 2 weeks  1. Independent in HEP and progression per patient tolerance, in order to prevent re-injury. [] Progressing: [x] Met: [] Not Met: [] Adjusted  2. Patient will have a decrease in pain to facilitate improvement in movement, function, and ADLs as indicated by Functional Deficits. [] Progressing: [x] Met: [] Not Met: [] Adjusted    Long Term Goals: To be achieved in: 12 weeks  1. Disability index score of 15% or less for the Quick DASH to assist with reaching prior level of function. [x] Progressing: [] Met: [] Not Met: [] Adjusted  2.  Patient will demonstrate increased R shoulder flexion, extension, ER, and IR AROM to

## 2024-01-01 NOTE — ANESTHESIA PRE PROCEDURE
Department of Anesthesiology  Preprocedure Note       Name:  Cole Valverde   Age:  81 y.o.  :  1950                                          MRN:  [id number]         Date:  2023      Surgeon: Melody Pollard):  Isaiah Robins MD    Procedure: Procedure(s):  RIGHT SHOULDER OPEN GANGLION CYST EXCISION    Medications prior to admission:   Prior to Admission medications    Medication Sig Start Date End Date Taking? Authorizing Provider   celecoxib (CELEBREX) 200 MG capsule Take by mouth daily 22   Historical Provider, MD   busPIRone (BUSPAR) 10 MG tablet Take by mouth 2 times daily 22   Historical Provider, MD   citalopram (CELEXA) 20 MG tablet Take by mouth daily 22   Historical Provider, MD   tamsulosin (FLOMAX) 0.4 MG capsule Take by mouth nightly 22   Historical Provider, MD   omeprazole (PRILOSEC) 20 MG delayed release capsule Take by mouth 2 times daily (before meals) 22   Historical Provider, MD   ezetimibe (ZETIA) 10 MG tablet Take by mouth nightly 22   Historical Provider, MD       Current medications:    Current Facility-Administered Medications   Medication Dose Route Frequency Provider Last Rate Last Admin    lactated ringers IV soln infusion   IntraVENous Continuous Blanka Gibson  mL/hr at 23 0649 NoRateChange at 23 0649    vancomycin (VANCOCIN) 1,500 mg in sodium chloride 0.9 % 250 mL IVPB  1,500 mg IntraVENous Once Isaiah Robins MD           Allergies: Allergies   Allergen Reactions    Hydrocodone-Acetaminophen Swelling and Other (See Comments)     faints        Penicillins Anaphylaxis     Patient states I can take Cephalosporins.  Statins Other (See Comments)     Flu like symptoms         Problem List:  There is no problem list on file for this patient.       Past Medical History:        Diagnosis Date    Arthritis     Cancer ([facility] [address]     H/O ulcer disease     Kidney problem        Past Surgical History:        Procedure Laterality Date    ACHILLES TENDON SURGERY Right     COLONOSCOPY      DEBRIDEMENT Right     thigh/hip    ELBOW SURGERY Right     ELBOW SURGERY Left     ENDOSCOPY, COLON, DIAGNOSTIC      HERNIA REPAIR Bilateral     inguinal    JOINT REPLACEMENT Left     knee    SKIN CANCER EXCISION      TONSILLECTOMY         Social History:    Social History     Tobacco Use    Smoking status: Former     Types: Cigarettes     Quit date:      Years since quittin.1    Smokeless tobacco: Never   Substance Use Topics    Alcohol use: Yes     Comment: socialy                                Counseling given: Not Answered      Vital Signs (Current):   Vitals:    23 0547   BP: (!) 144/91   Pulse: 85   Resp: 15   Temp: 97.9 °F (36.6 °C)   TempSrc: Temporal   SpO2: 99%   Weight: 215 lb 9.6 oz (97.8 kg)   Height: 5' 8\" (1.727 m)                                              BP Readings from Last 3 Encounters:   23 (!) 144/91       NPO Status: Time of last liquid consumption: 1400                        Time of last solid consumption: 1400                        Date of last liquid consumption: 23                        Date of last solid food consumption: 23    BMI:   Wt Readings from Last 3 Encounters:   23 215 lb 9.6 oz (97.8 kg)   23 223 lb (101.2 kg)   22 223 lb (101.2 kg)     Body mass index is 32.78 kg/m². CBC: No results found for: WBC, RBC, HGB, HCT, MCV, RDW, PLT    CMP: No results found for: NA, K, CL, CO2, BUN, CREATININE, GFRAA, AGRATIO, LABGLOM, GLUCOSE, GLU, PROT, CALCIUM, BILITOT, ALKPHOS, AST, ALT    POC Tests: No results for input(s): POCGLU, POCNA, POCK, POCCL, POCBUN, POCHEMO, POCHCT in the last 72 hours.     Coags: No results found for: PROTIME, INR, APTT    HCG (If Applicable): No results found for: PREGTESTUR, PREGSERUM, HCG, HCGQUANT     ABGs: No results found for: PHART, PO2ART, MYM0QTT, NBN4XDD, BEART, L0FMFXSR     Type & Screen (If Applicable):  No results found for: LABABO, LABRH    Drug/Infectious Status (If Applicable):  No results found for: HIV, HEPCAB    COVID-19 Screening (If Applicable): No results found for: COVID19        Anesthesia Evaluation  Patient summary reviewed and Nursing notes reviewed no history of anesthetic complications:   Airway: Mallampati: I  TM distance: >3 FB   Neck ROM: full  Mouth opening: > = 3 FB   Dental: normal exam         Pulmonary: breath sounds clear to auscultation      (-) sleep apnea and not a current smoker                           Cardiovascular:  Exercise tolerance: good (>4 METS),   (+) murmur,         Rhythm: regular  Rate: normal                    Neuro/Psych:               GI/Hepatic/Renal:   (+) PUD,           Endo/Other:                     Abdominal:             Vascular: Other Findings:           Anesthesia Plan      general     ASA 3       Induction: intravenous. MIPS: Prophylactic antiemetics administered. Anesthetic plan and risks discussed with patient. Plan discussed with CRNA.                     Virgilio Florez DO   2/6/2023 Patient requests all Lab, Cardiology, and Radiology Results on their Discharge Instructions

## 2024-04-03 ENCOUNTER — EVALUATION (OUTPATIENT)
Dept: PHYSICAL THERAPY | Age: 74
End: 2024-04-03
Payer: MEDICARE

## 2024-04-03 DIAGNOSIS — S33.5XXD LUMBAR SPRAIN, SUBSEQUENT ENCOUNTER: Primary | ICD-10-CM

## 2024-04-03 DIAGNOSIS — M51.37 DDD (DEGENERATIVE DISC DISEASE), LUMBOSACRAL: ICD-10-CM

## 2024-04-03 DIAGNOSIS — M54.32 SCIATICA OF LEFT SIDE: ICD-10-CM

## 2024-04-03 DIAGNOSIS — M43.17 SPONDYLOLISTHESIS, LUMBOSACRAL REGION: ICD-10-CM

## 2024-04-03 PROCEDURE — 97140 MANUAL THERAPY 1/> REGIONS: CPT | Performed by: PHYSICAL THERAPIST

## 2024-04-03 PROCEDURE — 97161 PT EVAL LOW COMPLEX 20 MIN: CPT | Performed by: PHYSICAL THERAPIST

## 2024-04-03 PROCEDURE — 97110 THERAPEUTIC EXERCISES: CPT | Performed by: PHYSICAL THERAPIST

## 2024-04-03 NOTE — PROGRESS NOTES
activity limitations, and/or participation restrictions  [x] A clinical presentation with evolving clinical presentation with changing characteristics  [x] Clinical decision making of LOW (74776 - Typically 20 minutes face-to-face) complexity using standardized patient assessment instrument and/or measurable assessment of functional outcome.    Today's Assessment: See above    Medical Necessity Documentation:  I certify that this patient meets the below criteria necessary for medical necessity for care and/or justification of therapy services:  The patient has functional impairments and/or activity limitations and would benefit from continued outpatient therapy services to address the deficits outlined in the patients goals    Return to Play: NA    Prognosis for POC: [x] Good [] Fair  [] Poor    Patient requires continued skilled intervention: [x] Yes  [] No      CHARGE CAPTURE     PT CHARGE GRID   CPT Code (TIMED) minutes # CPT Code (UNTIMED) #     Therex (81709)  15' 1  EVAL:LOW (23806 - Typically 20 minutes face-to-face) 1    Neuromusc. Re-ed (40148)    Re-Eval (34011)     Manual (53185) 10' 1  Estim Unattended (57642)     Ther. Act (66092)    Mech. Traction (64757)     Gait (94982)    Dry Needle 1-2 muscle (62825)     Aquatic Therex (60069)    Dry Needle 3+ muscle (20561)     Iontophoresis (77956)    VASO (23000)     Ultrasound (52185)    Group Therapy (32430)     Estim Attended (09814)    Canalith Repositioning (44453)     Other:    Other:    Total Timed Code Tx Minutes 25' 2  1     Total Treatment Minutes 60'        Charge Justification:  (11267) THERAPEUTIC EXERCISE - Provided verbal/tactile cueing for activities related to strengthening, flexibility, endurance, ROM performed to prevent loss of range of motion, maintain or improve muscular strength or increase flexibility, following either an injury or surgery.   (33278) HOME EXERCISE PROGRAM - Reviewed/Progressed HEP activities related to strengthening, 
0 (no pain/absence of nonverbal indicators of pain)

## 2024-04-08 ENCOUNTER — TREATMENT (OUTPATIENT)
Dept: PHYSICAL THERAPY | Age: 74
End: 2024-04-08
Payer: MEDICARE

## 2024-04-08 DIAGNOSIS — M43.17 SPONDYLOLISTHESIS, LUMBOSACRAL REGION: ICD-10-CM

## 2024-04-08 DIAGNOSIS — M54.32 SCIATICA OF LEFT SIDE: ICD-10-CM

## 2024-04-08 DIAGNOSIS — S33.5XXD LUMBAR SPRAIN, SUBSEQUENT ENCOUNTER: Primary | ICD-10-CM

## 2024-04-08 DIAGNOSIS — M51.37 DDD (DEGENERATIVE DISC DISEASE), LUMBOSACRAL: ICD-10-CM

## 2024-04-08 PROCEDURE — 97110 THERAPEUTIC EXERCISES: CPT | Performed by: PHYSICAL THERAPIST

## 2024-04-08 PROCEDURE — 97140 MANUAL THERAPY 1/> REGIONS: CPT | Performed by: PHYSICAL THERAPIST

## 2024-04-08 NOTE — PROGRESS NOTES
[facility]    [address] [place] [address]   P:[phone]  F:[phone]       Physical Therapy: TREATMENT/PROGRESS NOTE   Patient: Jurgen Plascencia (73 y.o. male)   Examination Date: 2024   :  1950 MRN: [id number]   Visit #: 2   Insurance Allowable Auth Needed   BMN []Yes    []No    Insurance: Payor: [facility] MEDICARE / Plan: [facility] MEDICARE ADVANTAGE / Product Type: *No Product type* /   Insurance ID: [id number] - (Medicare Managed)  Secondary Insurance (if applicable):    Treatment Diagnosis: back pain, flexibility limitations, weakness   Medical Diagnosis:S33.5XXA sprain of lumbar ligaments; M54.39 sciatica; M43.17 spondylolisthesis, M51.37 DDD   Referring Physician: Dr. Del Toro PCP: Cintia Keen APRN       Plan of care signed (Y/N): sent 4/3/24    Date of Patient follow up with Physician:      Progress Report/POC: EVAL today  POC update due: (10 visits /OR AUTH LIMITS, whichever is less)  2024                                             Precautions/ Contra-indications:           Latex allergy:  NO  Pacemaker:    NO  Contraindications for Manipulation: None  Date of Surgery: N/A;  Other:    Red Flags:  None    Preferred Language for Healthcare:   [x] English       [] other:    SUBJECTIVE EXAMINATION     Patient stated complaint: Pt felt good w/o pain after last session. He performed HS stretch and had pain in low back and SI region that has limited his activity since yesterday. He stopped exercises until he returned to review program. Pain in L SI region on arrival.     Test used Initial score  4/3/24 2024   Pain Summary VAS 4-8/10    Functional questionnaire Modified Oswestry 20/40%    Other:              Pain:  Pain location: L low back/ SI region  Patient describes pain to be constant, intermittent, Sharp, throbbing, and shooting  Pain decreases with: Ice and Heat  Pain increases with: Activity and

## 2024-04-10 ENCOUNTER — TREATMENT (OUTPATIENT)
Dept: PHYSICAL THERAPY | Age: 74
End: 2024-04-10
Payer: MEDICARE

## 2024-04-10 DIAGNOSIS — M43.17 SPONDYLOLISTHESIS, LUMBOSACRAL REGION: ICD-10-CM

## 2024-04-10 DIAGNOSIS — M51.37 DDD (DEGENERATIVE DISC DISEASE), LUMBOSACRAL: ICD-10-CM

## 2024-04-10 DIAGNOSIS — S33.5XXD LUMBAR SPRAIN, SUBSEQUENT ENCOUNTER: Primary | ICD-10-CM

## 2024-04-10 DIAGNOSIS — M54.32 SCIATICA OF LEFT SIDE: ICD-10-CM

## 2024-04-10 PROCEDURE — 97112 NEUROMUSCULAR REEDUCATION: CPT | Performed by: PHYSICAL THERAPIST

## 2024-04-10 PROCEDURE — 97110 THERAPEUTIC EXERCISES: CPT | Performed by: PHYSICAL THERAPIST

## 2024-04-10 PROCEDURE — 97140 MANUAL THERAPY 1/> REGIONS: CPT | Performed by: PHYSICAL THERAPIST

## 2024-04-10 NOTE — PROGRESS NOTES
pulling, jumping.)  Direct, one on one contact, billed in 15-minute increments.  (95608) MANUAL THERAPY -  Manual therapy techniques, 1 or more regions, each 15 minutes (Mobilization/manipulation, manual lymphatic drainage, manual traction) for the purpose of modulating pain, promoting relaxation,  increasing ROM, reducing/eliminating soft tissue swelling/inflammation/restriction, improving soft tissue extensibility and allowing for proper ROM for normal function with self care, mobility, lifting and ambulation  (52626) GAIT RE-EDUCATION - Provided training and instruction to the patient for proper joint and muscle recruitment and positioning and eccentric body weight control with ambulation re-education including up and down stairs. Therapeutic procedure, one or more areas, each 15 minutes;       GOALS     Patient stated goal: reduce pain to return to full function  Status: [] Progressing: [] Met: [] Not Met: [] Adjusted    Therapist goals for Patient:   Short Term Goals: To be achieved in: 2 weeks  Independent in HEP and progression per patient tolerance, in order to progress toward full function and prevent re-injury.    Status: [] Progressing: [] Met: [] Not Met: [] Adjusted  Patient will have a decrease in pain to 0/10 to help facilitate improvement in movement, function, and ADLs as indicated by functional deficits.   Status: [] Progressing: [] Met: [] Not Met: [] Adjusted    Long Term Goals: To be achieved in: 8 weeks  Disability index score of 10% or less for the Modified Oswestry to assist with return top prior level of function.   Status: [] Progressing: [] Met: [] Not Met: [] Adjusted  Improve hip and lumbar motion  to allow for proper joint functioning as indicated by patients functional deficits.  Status: [] Progressing: [] Met: [] Not Met: [] Adjusted  Pt to improve strength to 4+/5 or better of multifidus, TrA/abdominal, and posterior chain LE to allow for proper muscle and joint use in functional

## 2024-04-15 ENCOUNTER — TREATMENT (OUTPATIENT)
Dept: PHYSICAL THERAPY | Age: 74
End: 2024-04-15
Payer: MEDICARE

## 2024-04-15 DIAGNOSIS — M51.37 DDD (DEGENERATIVE DISC DISEASE), LUMBOSACRAL: ICD-10-CM

## 2024-04-15 DIAGNOSIS — M54.32 SCIATICA OF LEFT SIDE: ICD-10-CM

## 2024-04-15 DIAGNOSIS — M43.17 SPONDYLOLISTHESIS, LUMBOSACRAL REGION: ICD-10-CM

## 2024-04-15 DIAGNOSIS — S33.5XXD LUMBAR SPRAIN, SUBSEQUENT ENCOUNTER: Primary | ICD-10-CM

## 2024-04-15 PROCEDURE — 97140 MANUAL THERAPY 1/> REGIONS: CPT | Performed by: PHYSICAL THERAPIST

## 2024-04-15 PROCEDURE — 97112 NEUROMUSCULAR REEDUCATION: CPT | Performed by: PHYSICAL THERAPIST

## 2024-04-15 PROCEDURE — 97110 THERAPEUTIC EXERCISES: CPT | Performed by: PHYSICAL THERAPIST

## 2024-04-15 NOTE — PROGRESS NOTES
Status: [] Progressing: [] Met: [] Not Met: [] Adjusted  Patient will return to  putting shoes/socks on, squatting, and walk 1 mile  without increased symptoms or restriction to work towards return to prior level of function.        Status: [] Progressing: [] Met: [] Not Met: [] Adjusted  Patient will resume normal work/leisure activities without pain.            Status: [] Progressing: [] Met: [] Not Met: [] Adjusted    Overall Progression Towards Functional goals/ Treatment Progress Update:  [] Patient is progressing as expected towards functional goals listed.    [] Progression is slowed due to complexities/Impairments listed.  [] Progression has been slowed due to co-morbidities.  [] Plan just implemented, too soon (<30days) to assess goals progression   [] Goals require adjustment due to lack of progress  [] Patient is not progressing as expected and requires additional follow up with physician  [] Other:     TREATMENT PLAN     Frequency/Duration: 1-2x/week for 8 weeks for the following treatment interventions:    Interventions:  [x] Therapeutic exercise including: strength training, ROM, including postural re-education.   [x] NMR activation and proprioception, including postural re-education.    [x] Manual therapy as indicated to include: PROM, Gr I-IV mobilizations, and STM  [x] Modalities as needed that may include: Cryotherapy and Thermal Agents  [x] Patient education on joint protection, postural re-education, activity modification, progression of HEP.        [] Aquatic Therapy    Plan:  Progress pt to core stability program as pain resolves. Pt needs global strengthening in lower body as well.     Electronically Signed by Bridgette Perez, MOON, MS, OMT-C  Date: 04/15/2024     Physical Therapist [place] license #[id number]  Physical Therapist [place] license #[id number]   Note: Portions of this note have been templated and/or copied from initial evaluation, reassessments and prior notes for documentation efficiency.

## 2024-04-17 ENCOUNTER — TREATMENT (OUTPATIENT)
Dept: PHYSICAL THERAPY | Age: 74
End: 2024-04-17

## 2024-04-17 DIAGNOSIS — M54.32 SCIATICA OF LEFT SIDE: ICD-10-CM

## 2024-04-17 DIAGNOSIS — S33.5XXD LUMBAR SPRAIN, SUBSEQUENT ENCOUNTER: Primary | ICD-10-CM

## 2024-04-17 DIAGNOSIS — M43.17 SPONDYLOLISTHESIS, LUMBOSACRAL REGION: ICD-10-CM

## 2024-04-17 NOTE — PROGRESS NOTES
[facility]    [address] [place] [address]   P:[phone]  F:[phone]       Physical Therapy: TREATMENT/PROGRESS NOTE   Patient: Jurgen Plascencia (73 y.o. male)   Examination Date: 2024   :  1950 MRN: [id number]   Visit #: 5   Insurance Allowable Auth Needed   BMN []Yes    []No    Insurance: Payor: [facility] MEDICARE / Plan: [facility] MEDICARE ADVANTAGE / Product Type: *No Product type* /   Insurance ID: [id number] - (Medicare Managed)  Secondary Insurance (if applicable):    Treatment Diagnosis: back pain, flexibility limitations, weakness   Medical Diagnosis:S33.5XXA sprain of lumbar ligaments; M54.39 sciatica; M43.17 spondylolisthesis, M51.37 DDD   Referring Physician: Dr. Del Toro PCP: Cintia Keen APRN       Plan of care signed (Y/N): sent 4/3/24    Date of Patient follow up with Physician:      Progress Report/POC: NO ( eval on 4/3/24)  POC update due: (10 visits /OR AUTH LIMITS, whichever is less)  2024                                             Precautions/ Contra-indications:           Latex allergy:  NO  Pacemaker:    NO  Contraindications for Manipulation: None  Date of Surgery: N/A;  Other:    Red Flags:  None    Preferred Language for Healthcare:   [x] English       [] other:    SUBJECTIVE EXAMINATION     Patient stated complaint: pt was not as sore after last session. He feels that he is walking better. He is pleased w/ foot movement resulting in walking ability. He reports \"no pain\" in back.     Test used Initial score  4/3/24 2024   Pain Summary VAS 4-8/10 2-3/10   Functional questionnaire Modified Oswestry 20/40%    Other:              Pain:  Pain location: L low back/ SI region  Patient describes pain to be constant, intermittent, Sharp, throbbing, and shooting  Pain decreases with: Ice and Heat  Pain increases with: Activity and Movement     Relevant Medical History: L TKA, fusion of L hallux,

## 2024-04-22 ENCOUNTER — TREATMENT (OUTPATIENT)
Dept: PHYSICAL THERAPY | Age: 74
End: 2024-04-22
Payer: MEDICARE

## 2024-04-22 DIAGNOSIS — M43.17 SPONDYLOLISTHESIS, LUMBOSACRAL REGION: ICD-10-CM

## 2024-04-22 DIAGNOSIS — M54.32 SCIATICA OF LEFT SIDE: ICD-10-CM

## 2024-04-22 DIAGNOSIS — M51.37 DDD (DEGENERATIVE DISC DISEASE), LUMBOSACRAL: ICD-10-CM

## 2024-04-22 DIAGNOSIS — S33.5XXD LUMBAR SPRAIN, SUBSEQUENT ENCOUNTER: Primary | ICD-10-CM

## 2024-04-22 PROCEDURE — 97112 NEUROMUSCULAR REEDUCATION: CPT | Performed by: PHYSICAL THERAPIST

## 2024-04-22 PROCEDURE — 97140 MANUAL THERAPY 1/> REGIONS: CPT | Performed by: PHYSICAL THERAPIST

## 2024-04-22 PROCEDURE — 97110 THERAPEUTIC EXERCISES: CPT | Performed by: PHYSICAL THERAPIST

## 2024-04-22 NOTE — PROGRESS NOTES
[facility]    [address] [place] [address]   P:[phone]  F:[phone]       Physical Therapy: TREATMENT/PROGRESS NOTE   Patient: Jurgen Plascencia (73 y.o. male)   Examination Date: 2024   :  1950 MRN: [id number]   Visit #: 6   Insurance Allowable Auth Needed   BMN []Yes    []No    Insurance: Payor: [facility] MEDICARE / Plan: [facility] MEDICARE ADVANTAGE / Product Type: *No Product type* /   Insurance ID: [id number] - (Medicare Managed)  Secondary Insurance (if applicable):    Treatment Diagnosis: back pain, flexibility limitations, weakness   Medical Diagnosis:S33.5XXA sprain of lumbar ligaments; M54.39 sciatica; M43.17 spondylolisthesis, M51.37 DDD   Referring Physician: Dr. Del Toro PCP: Cintia Keen APRN       Plan of care signed (Y/N): sent 4/3/24    Date of Patient follow up with Physician:      Progress Report/POC: NO ( eval on 4/3/24)  POC update due: (10 visits /OR AUTH LIMITS, whichever is less)  2024                                             Precautions/ Contra-indications:           Latex allergy:  NO  Pacemaker:    NO  Contraindications for Manipulation: None  Date of Surgery: N/A;  Other:    Red Flags:  None    Preferred Language for Healthcare:   [x] English       [] other:    SUBJECTIVE EXAMINATION     Patient stated complaint: pt has been feeling well. He was working in yard today and is really tired and sore on arrival. Pain has resolved. He had MD appt and update in prescription for muscle relaxer was given. He does not have any follow up scheduled. Pt has noticed that he is walking better w/ inc in stability noted.        Test used Initial score  4/3/24 2024   Pain Summary VAS 4-8/10 2-3/10   Functional questionnaire Modified Oswestry 20/40%    Other:              Pain:  Pain location: L low back/ SI region  Patient describes pain to be constant, intermittent, Sharp, throbbing, and

## 2024-04-24 ENCOUNTER — TREATMENT (OUTPATIENT)
Dept: PHYSICAL THERAPY | Age: 74
End: 2024-04-24
Payer: MEDICARE

## 2024-04-24 DIAGNOSIS — M54.32 SCIATICA OF LEFT SIDE: ICD-10-CM

## 2024-04-24 DIAGNOSIS — M43.17 SPONDYLOLISTHESIS, LUMBOSACRAL REGION: ICD-10-CM

## 2024-04-24 DIAGNOSIS — S33.5XXD LUMBAR SPRAIN, SUBSEQUENT ENCOUNTER: Primary | ICD-10-CM

## 2024-04-24 DIAGNOSIS — M51.37 DDD (DEGENERATIVE DISC DISEASE), LUMBOSACRAL: ICD-10-CM

## 2024-04-24 PROCEDURE — 97110 THERAPEUTIC EXERCISES: CPT | Performed by: PHYSICAL THERAPIST

## 2024-04-24 PROCEDURE — 97140 MANUAL THERAPY 1/> REGIONS: CPT | Performed by: PHYSICAL THERAPIST

## 2024-04-24 PROCEDURE — 97112 NEUROMUSCULAR REEDUCATION: CPT | Performed by: PHYSICAL THERAPIST

## 2024-04-24 NOTE — PROGRESS NOTES
functional mobility, ADLs and prior level of function   Status: [] Progressing: [] Met: [] Not Met: [] Adjusted  Patient will return to  putting shoes/socks on, squatting, and walk 1 mile  without increased symptoms or restriction to work towards return to prior level of function.        Status: [] Progressing: [] Met: [] Not Met: [] Adjusted  Patient will resume normal work/leisure activities without pain.            Status: [] Progressing: [] Met: [] Not Met: [] Adjusted    Overall Progression Towards Functional goals/ Treatment Progress Update:  [x] Patient is progressing as expected towards functional goals listed.    [] Progression is slowed due to complexities/Impairments listed.  [] Progression has been slowed due to co-morbidities.  [] Plan just implemented, too soon (<30days) to assess goals progression   [] Goals require adjustment due to lack of progress  [] Patient is not progressing as expected and requires additional follow up with physician  [] Other:     TREATMENT PLAN     Frequency/Duration: 1-2x/week for 8 weeks for the following treatment interventions:    Interventions:  [x] Therapeutic exercise including: strength training, ROM, including postural re-education.   [x] NMR activation and proprioception, including postural re-education.    [x] Manual therapy as indicated to include: PROM, Gr I-IV mobilizations, and STM  [x] Modalities as needed that may include: Cryotherapy and Thermal Agents  [x] Patient education on joint protection, postural re-education, activity modification, progression of HEP.        [] Aquatic Therapy    Plan:  Progress pt to core stability program as pain resolves. Pt needs global strengthening in lower body as well. Cont to give cuing for gt pattern     Electronically Signed by Bridgette Perez PT, MS, OMT-C  Date: 04/24/2024     Physical Therapist [place] license #[id number]  Physical Therapist [place] license #[id number]   Note: Portions of this note have been templated and/or copied from

## 2024-05-01 ENCOUNTER — TREATMENT (OUTPATIENT)
Dept: PHYSICAL THERAPY | Age: 74
End: 2024-05-01
Payer: MEDICARE

## 2024-05-01 DIAGNOSIS — S33.5XXD LUMBAR SPRAIN, SUBSEQUENT ENCOUNTER: Primary | ICD-10-CM

## 2024-05-01 DIAGNOSIS — M43.17 SPONDYLOLISTHESIS, LUMBOSACRAL REGION: ICD-10-CM

## 2024-05-01 DIAGNOSIS — M54.32 SCIATICA OF LEFT SIDE: ICD-10-CM

## 2024-05-01 DIAGNOSIS — M51.37 DDD (DEGENERATIVE DISC DISEASE), LUMBOSACRAL: ICD-10-CM

## 2024-05-01 PROCEDURE — 97112 NEUROMUSCULAR REEDUCATION: CPT | Performed by: PHYSICAL THERAPIST

## 2024-05-01 PROCEDURE — 97140 MANUAL THERAPY 1/> REGIONS: CPT | Performed by: PHYSICAL THERAPIST

## 2024-05-01 PROCEDURE — 97110 THERAPEUTIC EXERCISES: CPT | Performed by: PHYSICAL THERAPIST

## 2024-05-01 NOTE — PROGRESS NOTES
ACTIVITY - use of dynamic activities to improve functional performance. (Ex include squatting, ascending/descending stairs, walking, bending, lifting, catching, throwing, pushing, pulling, jumping.)  Direct, one on one contact, billed in 15-minute increments.  (12932) MANUAL THERAPY -  Manual therapy techniques, 1 or more regions, each 15 minutes (Mobilization/manipulation, manual lymphatic drainage, manual traction) for the purpose of modulating pain, promoting relaxation,  increasing ROM, reducing/eliminating soft tissue swelling/inflammation/restriction, improving soft tissue extensibility and allowing for proper ROM for normal function with self care, mobility, lifting and ambulation  (30352) GAIT RE-EDUCATION - Provided training and instruction to the patient for proper joint and muscle recruitment and positioning and eccentric body weight control with ambulation re-education including up and down stairs. Therapeutic procedure, one or more areas, each 15 minutes;       GOALS     Patient stated goal: reduce pain to return to full function  Status: [] Progressing: [] Met: [] Not Met: [] Adjusted    Therapist goals for Patient:   Short Term Goals: To be achieved in: 2 weeks  Independent in HEP and progression per patient tolerance, in order to progress toward full function and prevent re-injury.    Status: [] Progressing: [] Met: [] Not Met: [] Adjusted  Patient will have a decrease in pain to 0/10 to help facilitate improvement in movement, function, and ADLs as indicated by functional deficits.   Status: [] Progressing: [] Met: [] Not Met: [] Adjusted    Long Term Goals: To be achieved in: 8 weeks  Disability index score of 10% or less for the Modified Oswestry to assist with return top prior level of function.   Status: [] Progressing: [] Met: [] Not Met: [] Adjusted  Improve hip and lumbar motion  to allow for proper joint functioning as indicated by patients functional deficits.  Status: [] Progressing: []

## 2024-05-06 ENCOUNTER — TREATMENT (OUTPATIENT)
Dept: PHYSICAL THERAPY | Age: 74
End: 2024-05-06
Payer: MEDICARE

## 2024-05-06 DIAGNOSIS — S33.5XXD LUMBAR SPRAIN, SUBSEQUENT ENCOUNTER: Primary | ICD-10-CM

## 2024-05-06 DIAGNOSIS — M54.32 SCIATICA OF LEFT SIDE: ICD-10-CM

## 2024-05-06 DIAGNOSIS — M43.17 SPONDYLOLISTHESIS, LUMBOSACRAL REGION: ICD-10-CM

## 2024-05-06 DIAGNOSIS — M51.37 DDD (DEGENERATIVE DISC DISEASE), LUMBOSACRAL: ICD-10-CM

## 2024-05-06 PROCEDURE — 97140 MANUAL THERAPY 1/> REGIONS: CPT | Performed by: PHYSICAL THERAPIST

## 2024-05-06 PROCEDURE — 97110 THERAPEUTIC EXERCISES: CPT | Performed by: PHYSICAL THERAPIST

## 2024-05-06 NOTE — PROGRESS NOTES
HEP activities related to strengthening, flexibility, endurance, ROM performed to prevent loss of range of motion, maintain or improve muscular strength or increase flexibility, following either an injury or surgery.  (27096) NEUROMUSCULAR RE-EDUCATION - Therapeutic procedure, 1 or more areas, each 15 minutes; neuromuscular reeducation of movement, balance, coordination, kinesthetic sense, posture, and/or proprioception for sitting and/or standing activities  (23175) THERAPEUTIC ACTIVITY - use of dynamic activities to improve functional performance. (Ex include squatting, ascending/descending stairs, walking, bending, lifting, catching, throwing, pushing, pulling, jumping.)  Direct, one on one contact, billed in 15-minute increments.  (40385) MANUAL THERAPY -  Manual therapy techniques, 1 or more regions, each 15 minutes (Mobilization/manipulation, manual lymphatic drainage, manual traction) for the purpose of modulating pain, promoting relaxation,  increasing ROM, reducing/eliminating soft tissue swelling/inflammation/restriction, improving soft tissue extensibility and allowing for proper ROM for normal function with self care, mobility, lifting and ambulation  (00416) GAIT RE-EDUCATION - Provided training and instruction to the patient for proper joint and muscle recruitment and positioning and eccentric body weight control with ambulation re-education including up and down stairs. Therapeutic procedure, one or more areas, each 15 minutes;       GOALS     Patient stated goal: reduce pain to return to full function  Status: [] Progressing: [] Met: [] Not Met: [] Adjusted    Therapist goals for Patient:   Short Term Goals: To be achieved in: 2 weeks  Independent in HEP and progression per patient tolerance, in order to progress toward full function and prevent re-injury.    Status: [] Progressing: [] Met: [] Not Met: [] Adjusted  Patient will have a decrease in pain to 0/10 to help facilitate improvement in

## 2024-05-08 ENCOUNTER — TREATMENT (OUTPATIENT)
Dept: PHYSICAL THERAPY | Age: 74
End: 2024-05-08
Payer: MEDICARE

## 2024-05-08 DIAGNOSIS — M43.17 SPONDYLOLISTHESIS, LUMBOSACRAL REGION: ICD-10-CM

## 2024-05-08 DIAGNOSIS — S33.5XXD LUMBAR SPRAIN, SUBSEQUENT ENCOUNTER: Primary | ICD-10-CM

## 2024-05-08 DIAGNOSIS — M54.32 SCIATICA OF LEFT SIDE: ICD-10-CM

## 2024-05-08 DIAGNOSIS — M51.37 DDD (DEGENERATIVE DISC DISEASE), LUMBOSACRAL: ICD-10-CM

## 2024-05-08 PROCEDURE — 97112 NEUROMUSCULAR REEDUCATION: CPT | Performed by: PHYSICAL THERAPIST

## 2024-05-08 PROCEDURE — 97140 MANUAL THERAPY 1/> REGIONS: CPT | Performed by: PHYSICAL THERAPIST

## 2024-05-08 PROCEDURE — 97110 THERAPEUTIC EXERCISES: CPT | Performed by: PHYSICAL THERAPIST

## 2024-05-08 NOTE — PROGRESS NOTES
therapy to address core control.     Electronically Signed by Bridgette Perez PT, MS, OMT-C  Date: 05/08/2024     Physical Therapist [place] license #[id number]  Physical Therapist [place] license #[id number]   Note: Portions of this note have been templated and/or copied from initial evaluation, reassessments and prior notes for documentation efficiency.

## 2024-05-13 ENCOUNTER — TREATMENT (OUTPATIENT)
Dept: PHYSICAL THERAPY | Age: 74
End: 2024-05-13
Payer: MEDICARE

## 2024-05-13 DIAGNOSIS — M54.32 SCIATICA OF LEFT SIDE: ICD-10-CM

## 2024-05-13 DIAGNOSIS — M51.37 DDD (DEGENERATIVE DISC DISEASE), LUMBOSACRAL: ICD-10-CM

## 2024-05-13 DIAGNOSIS — S33.5XXD LUMBAR SPRAIN, SUBSEQUENT ENCOUNTER: Primary | ICD-10-CM

## 2024-05-13 DIAGNOSIS — M43.17 SPONDYLOLISTHESIS, LUMBOSACRAL REGION: ICD-10-CM

## 2024-05-13 PROCEDURE — 97110 THERAPEUTIC EXERCISES: CPT | Performed by: PHYSICAL THERAPIST

## 2024-05-13 PROCEDURE — 97112 NEUROMUSCULAR REEDUCATION: CPT | Performed by: PHYSICAL THERAPIST

## 2024-05-13 PROCEDURE — 97140 MANUAL THERAPY 1/> REGIONS: CPT | Performed by: PHYSICAL THERAPIST

## 2024-05-13 NOTE — PROGRESS NOTES
reeducation of movement, balance, coordination, kinesthetic sense, posture, and/or proprioception for sitting and/or standing activities  (62710) THERAPEUTIC ACTIVITY - use of dynamic activities to improve functional performance. (Ex include squatting, ascending/descending stairs, walking, bending, lifting, catching, throwing, pushing, pulling, jumping.)  Direct, one on one contact, billed in 15-minute increments.  (46736) MANUAL THERAPY -  Manual therapy techniques, 1 or more regions, each 15 minutes (Mobilization/manipulation, manual lymphatic drainage, manual traction) for the purpose of modulating pain, promoting relaxation,  increasing ROM, reducing/eliminating soft tissue swelling/inflammation/restriction, improving soft tissue extensibility and allowing for proper ROM for normal function with self care, mobility, lifting and ambulation  (60697) GAIT RE-EDUCATION - Provided training and instruction to the patient for proper joint and muscle recruitment and positioning and eccentric body weight control with ambulation re-education including up and down stairs. Therapeutic procedure, one or more areas, each 15 minutes;       GOALS     Patient stated goal: reduce pain to return to full function  Status: [] Progressing: [] Met: [] Not Met: [] Adjusted    Therapist goals for Patient:   Short Term Goals: To be achieved in: 2 weeks  Independent in HEP and progression per patient tolerance, in order to progress toward full function and prevent re-injury.    Status: [] Progressing: [] Met: [] Not Met: [] Adjusted  Patient will have a decrease in pain to 0/10 to help facilitate improvement in movement, function, and ADLs as indicated by functional deficits.   Status: [] Progressing: [] Met: [] Not Met: [] Adjusted    Long Term Goals: To be achieved in: 8 weeks  Disability index score of 10% or less for the Modified Oswestry to assist with return top prior level of function.   Status: [] Progressing: [] Met: [] Not Met:

## 2024-05-15 ENCOUNTER — TREATMENT (OUTPATIENT)
Dept: PHYSICAL THERAPY | Age: 74
End: 2024-05-15

## 2024-05-15 DIAGNOSIS — S33.5XXD LUMBAR SPRAIN, SUBSEQUENT ENCOUNTER: Primary | ICD-10-CM

## 2024-05-15 DIAGNOSIS — M54.32 SCIATICA OF LEFT SIDE: ICD-10-CM

## 2024-05-15 DIAGNOSIS — M43.17 SPONDYLOLISTHESIS, LUMBOSACRAL REGION: ICD-10-CM

## 2024-05-15 DIAGNOSIS — M51.37 DDD (DEGENERATIVE DISC DISEASE), LUMBOSACRAL: ICD-10-CM

## 2024-05-15 NOTE — PROGRESS NOTES
function.   Status: [] Progressing: [] Met: [] Not Met: [] Adjusted  Improve hip and lumbar motion  to allow for proper joint functioning as indicated by patients functional deficits.  Status: [x] Progressing: [] Met: [] Not Met: [] Adjusted  Pt to improve strength to 4+/5 or better of multifidus, TrA/abdominal, and posterior chain LE to allow for proper muscle and joint use in functional mobility, ADLs and prior level of function   Status: [x] Progressing: [] Met: [] Not Met: [] Adjusted  Patient will return to  putting shoes/socks on, squatting, and walk 1 mile  without increased symptoms or restriction to work towards return to prior level of function.        Status:  Pr[x]ogressing: [] Met: [] Not Met: [] Adjusted  Patient will resume normal work/leisure activities without pain.            Status: [x] Progressing: [] Met: [] Not Met: [] Adjusted    Overall Progression Towards Functional goals/ Treatment Progress Update:  [x] Patient is progressing as expected towards functional goals listed.    [] Progression is slowed due to complexities/Impairments listed.  [] Progression has been slowed due to co-morbidities.  [] Plan just implemented, too soon (<30days) to assess goals progression   [] Goals require adjustment due to lack of progress  [] Patient is not progressing as expected and requires additional follow up with physician  [] Other:     TREATMENT PLAN     Frequency/Duration: 1-2x/week for 8 weeks for the following treatment interventions:    Interventions:  [x] Therapeutic exercise including: strength training, ROM, including postural re-education.   [x] NMR activation and proprioception, including postural re-education.    [x] Manual therapy as indicated to include: PROM, Gr I-IV mobilizations, and STM  [x] Modalities as needed that may include: Cryotherapy and Thermal Agents  [x] Patient education on joint protection, postural re-education, activity modification, progression of HEP.        [] Aquatic

## 2024-05-20 ENCOUNTER — TREATMENT (OUTPATIENT)
Dept: PHYSICAL THERAPY | Age: 74
End: 2024-05-20
Payer: MEDICARE

## 2024-05-20 DIAGNOSIS — M43.17 SPONDYLOLISTHESIS, LUMBOSACRAL REGION: ICD-10-CM

## 2024-05-20 DIAGNOSIS — M51.37 DDD (DEGENERATIVE DISC DISEASE), LUMBOSACRAL: ICD-10-CM

## 2024-05-20 DIAGNOSIS — M54.32 SCIATICA OF LEFT SIDE: ICD-10-CM

## 2024-05-20 DIAGNOSIS — S33.5XXD LUMBAR SPRAIN, SUBSEQUENT ENCOUNTER: Primary | ICD-10-CM

## 2024-05-20 PROCEDURE — 97140 MANUAL THERAPY 1/> REGIONS: CPT | Performed by: PHYSICAL THERAPIST

## 2024-05-20 PROCEDURE — 97112 NEUROMUSCULAR REEDUCATION: CPT | Performed by: PHYSICAL THERAPIST

## 2024-05-20 PROCEDURE — 97110 THERAPEUTIC EXERCISES: CPT | Performed by: PHYSICAL THERAPIST

## 2024-05-20 NOTE — PROGRESS NOTES
[facility]    [address] [place] [address]   P:[phone]  F:[phone]       Physical Therapy: TREATMENT/PROGRESS NOTE   Patient: Jurgen Plascencia (73 y.o. male)   Examination Date: 2024   :  1950 MRN: [id number]   Visit #: 13   Insurance Allowable Auth Needed   BMN []Yes    []No    Insurance: Payor: [facility] MEDICARE / Plan: [facility] MEDICARE ADVANTAGE / Product Type: *No Product type* /   Insurance ID: [id number] - (Medicare Managed)  Secondary Insurance (if applicable):    Treatment Diagnosis: back pain, flexibility limitations, weakness   Medical Diagnosis:S33.5XXA sprain of lumbar ligaments; M54.39 sciatica; M43.17 spondylolisthesis, M51.37 DDD   Referring Physician: Dr. Del Toro PCP: Cintia Keen APRN       Plan of care signed (Y/N): sent 4/3/24    Date of Patient follow up with Physician:      Progress Report/POC: NO ( eval on 4/3/24)  POC update due: (10 visits /OR AUTH LIMITS, whichever is less)  2024                                             Precautions/ Contra-indications:           Latex allergy:  NO  Pacemaker:    NO  Contraindications for Manipulation: None  Date of Surgery: N/A;  Other:    Red Flags:  None    Preferred Language for Healthcare:   [x] English       [] other:    SUBJECTIVE EXAMINATION     Patient stated complaint: pt states that he is \"doing great\" and not having any pain. He took it easy yesterday but was able to walk yesterday on  several times for 5-10 min at a time. He felt good after workout on Monday w/ min soreness in muscles but no pain.      Test used Initial score  4/3/24 2024   Pain Summary VAS 4-8/10 0/10   Functional questionnaire Modified Oswestry 20/40%    Other:              Pain:  Pain location: L low back/ SI region  Patient describes pain to be constant, intermittent, Sharp, throbbing, and shooting  Pain decreases with: Ice and Heat  Pain increases with: Activity

## 2025-03-26 ENCOUNTER — EVALUATION (OUTPATIENT)
Dept: PHYSICAL THERAPY | Age: 75
End: 2025-03-26

## 2025-03-26 DIAGNOSIS — R26.9 GAIT DIFFICULTY: ICD-10-CM

## 2025-03-26 DIAGNOSIS — M16.11 PRIMARY OSTEOARTHRITIS OF RIGHT HIP: Primary | ICD-10-CM

## 2025-03-26 DIAGNOSIS — R29.898 WEAKNESS OF EXTREMITY: ICD-10-CM

## 2025-03-26 NOTE — PROGRESS NOTES
[facility]: [address] [place] [address]   office: [phone]  fax: [phone]     Physical Therapy Initial Evaluation Certification      Dear Marbin Gavin* ,    We had the pleasure of evaluating the following patient for physical therapy services at [facility].  A summary of our findings can be found in the initial assessment below.  This includes our plan of care.  If you have any questions or concerns regarding these findings, please do not hesitate to contact me at the office phone number listed above.  Thank you for the referral.     Physician Signature:_______________________________Date:__________________  By signing above (or electronic signature), therapist’s plan is approved by physician       Physical Therapy: TREATMENT/PROGRESS NOTE   Patient: Jurgen Plascencia (74 y.o. male)   Examination Date: 2025   :  1950 MRN: [id number]   Visit #: 1   Insurance Allowable Auth Needed   ?? []Yes    []No    Insurance: Payor: [facility] MEDICARE / Plan: [facility] MEDICARE ADVANTAGE / Product Type: *No Product type* /   Insurance ID: [id number] - (Medicare Managed)  Secondary Insurance (if applicable):    Treatment Diagnosis: R hip pain, weakness in R LE, gt difficulty,    Medical Diagnosis:     M16.11 (ICD-10-CM) - Unilateral primary osteoarthritis, right hip      Referring Physician: Marbin Toledo Sc*  PCP: Cintia Keen APRN     Plan of care signed (Y/N): faxed 3/26/25    Date of Patient follow up with Physician:      Plan of Care Report: EVAL today  POC update due: (10 visits /OR AUTH LIMITS, whichever is less)  2025                                             Medical History:  Comorbidities:  Cancer/Tumor  Osteoporosis/Osteopenia  Osteoarthritis  Rheumatoid Arthritis  Anxiety  Depression  Relevant Medical History: hx of shoulder surg; L ankle surg;

## (undated) DEVICE — SUTURE VCRL SZ 2-0 L18IN ABSRB UD CT-1 L36MM 1/2 CIR J839D

## (undated) DEVICE — SLING ORTH COMFORTABLE LG 13-15 IN HND STRP HK ULTRASLING II

## (undated) DEVICE — SPONGE GZ W4XL8IN COT WVN 12 PLY

## (undated) DEVICE — ELECTROSURGICAL PENCIL ROCKER SWITCH NON COATED BLADE ELECTRODE 10 FT (3 M) CORD HOLSTER: Brand: MEGADYNE

## (undated) DEVICE — E-Z CLEAN, NON-STICK, PTFE COATED, ELECTROSURGICAL NEEDLE ELECTRODE, MODIFIED EXTENDED INSULATION, 2.75 INCH (7 CM): Brand: MEGADYNE

## (undated) DEVICE — STANDARD HYPODERMIC NEEDLE,POLYPROPYLENE HUB: Brand: MONOJECT

## (undated) DEVICE — GAUZE,SPONGE,4"X4",16PLY,XRAY,STRL,LF: Brand: MEDLINE

## (undated) DEVICE — SPONGE,PEANUT,XRAY,ST,SM,3/8",5/CARD: Brand: MEDLINE INDUSTRIES, INC.

## (undated) DEVICE — COVER,TABLE,HEAVY DUTY,77"X90",STRL: Brand: MEDLINE

## (undated) DEVICE — NEEDLE SUT 1/2 CIR TAPR TIP TNSL STRL SZ 1 DAVIS

## (undated) DEVICE — SUTURE MCRYL SZ 4-0 L18IN ABSRB UD L19MM PS-2 3/8 CIR PRIM Y496G

## (undated) DEVICE — GOWN,SIRUS,POLYRNF,BRTHSLV,XL,30/CS: Brand: MEDLINE

## (undated) DEVICE — SHOULDER ARTHROSCOPY: Brand: MEDLINE INDUSTRIES, INC.

## (undated) DEVICE — SHEET,DRAPE,53X77,STERILE: Brand: MEDLINE

## (undated) DEVICE — INTENDED FOR TISSUE SEPARATION, AND OTHER PROCEDURES THAT REQUIRE A SHARP SURGICAL BLADE TO PUNCTURE OR CUT.: Brand: BARD-PARKER ® CARBON RIB-BACK BLADES

## (undated) DEVICE — GLOVE ORANGE PI 8   MSG9080

## (undated) DEVICE — TOWEL,STOP FLAG GOLD N-W: Brand: MEDLINE

## (undated) DEVICE — SUTURE VCRL SZ 0 L18IN ABSRB UD L36MM CT-1 1/2 CIR J840D

## (undated) DEVICE — UNDERGLOVE SURG SZ 8 BLU LTX FREE SYN POLYISOPRENE POLYMER

## (undated) DEVICE — SYRINGE IRRIG 60ML SFT PLIABLE BLB EZ TO GRP 1 HND USE W/

## (undated) DEVICE — SOLUTION IV IRRIG WATER 1000ML POUR BRL 2F7114

## (undated) DEVICE — FRAZIER SUCTION INSTRUMENT 12 FR W/CONTROL VENT & OBTURATOR: Brand: FRAZIER

## (undated) DEVICE — 3M™ IOBAN™ 2 ANTIMICROBIAL INCISE DRAPE 6648EZ: Brand: IOBAN™ 2

## (undated) DEVICE — SYSTEM SKIN CLSR 22CM DERMBND PRINEO